# Patient Record
Sex: MALE | Race: WHITE | NOT HISPANIC OR LATINO | URBAN - METROPOLITAN AREA
[De-identification: names, ages, dates, MRNs, and addresses within clinical notes are randomized per-mention and may not be internally consistent; named-entity substitution may affect disease eponyms.]

---

## 2021-08-04 ENCOUNTER — INPATIENT (INPATIENT)
Facility: HOSPITAL | Age: 21
LOS: 15 days | Discharge: HOME | End: 2021-08-20
Attending: STUDENT IN AN ORGANIZED HEALTH CARE EDUCATION/TRAINING PROGRAM | Admitting: PSYCHIATRY & NEUROLOGY
Payer: COMMERCIAL

## 2021-08-04 VITALS
HEART RATE: 78 BPM | TEMPERATURE: 98 F | SYSTOLIC BLOOD PRESSURE: 113 MMHG | DIASTOLIC BLOOD PRESSURE: 74 MMHG | RESPIRATION RATE: 20 BRPM | OXYGEN SATURATION: 99 %

## 2021-08-04 NOTE — ED PROVIDER NOTE - OBJECTIVE STATEMENT
[place]: Bipolar. Overdose on melatonin. Ate CBD gummies today (3 bags). Says, "My mother has HBV God death sex but they don't understand what God feels like. [place] crazy experiences." Says he's very poplar and famous ("I never thought I was as important as I am. Many girls told me I can have their kids.") Tangential. Disorganized. Stayed at [facility] in [place]. From [facility]. R foot "feels it's being torn to shreds" x 1 wk, getting better. Says he walked from [place] to [place] (1 hour car ride). No SI/HI.

## 2021-08-04 NOTE — ED PROVIDER NOTE - PROGRESS NOTE DETAILS
Delano Roach D.O., PGY3 (Resident)  Patient still appears acutely manic with flight of ideas, tangential. Pending urine drug screen Delano Roach D.O., PGY3 (Resident)  Discussed with psych, will see patient.

## 2021-08-04 NOTE — ED PROVIDER NOTE - ATTENDING CONTRIBUTION TO CARE
I performed a face-to-face evaluation of the patient and performed a history and physical examination. I agree with the history and physical examination.    Bipolar. Manic phase. Check Psych labs. Psych consult for collaboration. Admit.

## 2021-08-04 NOTE — ED PROVIDER NOTE - PHYSICAL EXAMINATION
Well appearing, well nourished, awake, alert, oriented to person, place, time/situation and in no apparent distress.    Airway patent    Eyes without scleral injection. No jaundice.    Strong pulse.    Respirations unlabored.    Abdomen soft, non-tender, no guarding.    Spine appears normal, range of motion is not limited, no muscle or joint tenderness.    Alert and oriented, no gross motor or sensory deficits.    Skin normal color for race, warm, dry and intact. No evidence of rash.    No SI/HI. Well appearing, well nourished, awake, alert, oriented to person, place, time/situation and in no apparent distress.    Airway patent    Eyes without scleral injection. No jaundice.    Strong pulse.    Respirations unlabored.    Abdomen soft, non-tender, no guarding.    Spine appears normal, range of motion is not limited, no muscle or joint tenderness.    Alert and oriented, no gross motor or sensory deficits.    Skin normal color for race, warm, dry and intact. No evidence of rash.

## 2021-08-04 NOTE — ED ADULT TRIAGE NOTE - CHIEF COMPLAINT QUOTE
Pt overdosed on marijuana edibles. Pt with nausea and vomiting. As per EMS patient has been having hallucinations and delusions.
114

## 2021-08-05 DIAGNOSIS — F31.9 BIPOLAR DISORDER, UNSPECIFIED: ICD-10-CM

## 2021-08-05 LAB
ALBUMIN SERPL ELPH-MCNC: 4.4 G/DL — SIGNIFICANT CHANGE UP (ref 3.3–5)
ALP SERPL-CCNC: 38 U/L — LOW (ref 40–120)
ALT FLD-CCNC: 18 U/L — SIGNIFICANT CHANGE UP (ref 4–41)
ANION GAP SERPL CALC-SCNC: 14 MMOL/L — SIGNIFICANT CHANGE UP (ref 7–14)
APPEARANCE UR: SIGNIFICANT CHANGE UP
AST SERPL-CCNC: 31 U/L — SIGNIFICANT CHANGE UP (ref 4–40)
BASOPHILS # BLD AUTO: 0.03 K/UL — SIGNIFICANT CHANGE UP (ref 0–0.2)
BASOPHILS NFR BLD AUTO: 0.3 % — SIGNIFICANT CHANGE UP (ref 0–2)
BILIRUB SERPL-MCNC: 0.3 MG/DL — SIGNIFICANT CHANGE UP (ref 0.2–1.2)
BILIRUB UR-MCNC: NEGATIVE — SIGNIFICANT CHANGE UP
BUN SERPL-MCNC: 14 MG/DL — SIGNIFICANT CHANGE UP (ref 7–23)
CALCIUM SERPL-MCNC: 9.2 MG/DL — SIGNIFICANT CHANGE UP (ref 8.4–10.5)
CHLORIDE SERPL-SCNC: 105 MMOL/L — SIGNIFICANT CHANGE UP (ref 98–107)
CO2 SERPL-SCNC: 23 MMOL/L — SIGNIFICANT CHANGE UP (ref 22–31)
COLOR SPEC: YELLOW — SIGNIFICANT CHANGE UP
COVID-19 SPIKE DOMAIN AB INTERP: POSITIVE
COVID-19 SPIKE DOMAIN ANTIBODY RESULT: >250 U/ML — HIGH
CREAT SERPL-MCNC: 0.82 MG/DL — SIGNIFICANT CHANGE UP (ref 0.5–1.3)
DIFF PNL FLD: NEGATIVE — SIGNIFICANT CHANGE UP
EOSINOPHIL # BLD AUTO: 0.06 K/UL — SIGNIFICANT CHANGE UP (ref 0–0.5)
EOSINOPHIL NFR BLD AUTO: 0.6 % — SIGNIFICANT CHANGE UP (ref 0–6)
GLUCOSE SERPL-MCNC: 152 MG/DL — HIGH (ref 70–99)
GLUCOSE UR QL: NEGATIVE — SIGNIFICANT CHANGE UP
HCT VFR BLD CALC: 41 % — SIGNIFICANT CHANGE UP (ref 39–50)
HGB BLD-MCNC: 13.9 G/DL — SIGNIFICANT CHANGE UP (ref 13–17)
IANC: 7.18 K/UL — SIGNIFICANT CHANGE UP (ref 1.5–8.5)
IMM GRANULOCYTES NFR BLD AUTO: 0.2 % — SIGNIFICANT CHANGE UP (ref 0–1.5)
KETONES UR-MCNC: NEGATIVE — SIGNIFICANT CHANGE UP
LEUKOCYTE ESTERASE UR-ACNC: NEGATIVE — SIGNIFICANT CHANGE UP
LYMPHOCYTES # BLD AUTO: 1.47 K/UL — SIGNIFICANT CHANGE UP (ref 1–3.3)
LYMPHOCYTES # BLD AUTO: 15.7 % — SIGNIFICANT CHANGE UP (ref 13–44)
MCHC RBC-ENTMCNC: 30.2 PG — SIGNIFICANT CHANGE UP (ref 27–34)
MCHC RBC-ENTMCNC: 33.9 GM/DL — SIGNIFICANT CHANGE UP (ref 32–36)
MCV RBC AUTO: 89.1 FL — SIGNIFICANT CHANGE UP (ref 80–100)
MONOCYTES # BLD AUTO: 0.63 K/UL — SIGNIFICANT CHANGE UP (ref 0–0.9)
MONOCYTES NFR BLD AUTO: 6.7 % — SIGNIFICANT CHANGE UP (ref 2–14)
NEUTROPHILS # BLD AUTO: 7.18 K/UL — SIGNIFICANT CHANGE UP (ref 1.8–7.4)
NEUTROPHILS NFR BLD AUTO: 76.5 % — SIGNIFICANT CHANGE UP (ref 43–77)
NITRITE UR-MCNC: NEGATIVE — SIGNIFICANT CHANGE UP
NRBC # BLD: 0 /100 WBCS — SIGNIFICANT CHANGE UP
NRBC # FLD: 0 K/UL — SIGNIFICANT CHANGE UP
PCP SPEC-MCNC: SIGNIFICANT CHANGE UP
PH UR: 6 — SIGNIFICANT CHANGE UP (ref 5–8)
PLATELET # BLD AUTO: 189 K/UL — SIGNIFICANT CHANGE UP (ref 150–400)
POTASSIUM SERPL-MCNC: 4 MMOL/L — SIGNIFICANT CHANGE UP (ref 3.5–5.3)
POTASSIUM SERPL-SCNC: 4 MMOL/L — SIGNIFICANT CHANGE UP (ref 3.5–5.3)
PROT SERPL-MCNC: 6.4 G/DL — SIGNIFICANT CHANGE UP (ref 6–8.3)
PROT UR-MCNC: ABNORMAL
RBC # BLD: 4.6 M/UL — SIGNIFICANT CHANGE UP (ref 4.2–5.8)
RBC # FLD: 11.7 % — SIGNIFICANT CHANGE UP (ref 10.3–14.5)
SARS-COV-2 IGG+IGM SERPL QL IA: >250 U/ML — HIGH
SARS-COV-2 IGG+IGM SERPL QL IA: POSITIVE
SARS-COV-2 RNA SPEC QL NAA+PROBE: SIGNIFICANT CHANGE UP
SODIUM SERPL-SCNC: 142 MMOL/L — SIGNIFICANT CHANGE UP (ref 135–145)
SP GR SPEC: 1.04 — HIGH (ref 1.01–1.02)
TOXICOLOGY SCREEN, DRUGS OF ABUSE, SERUM RESULT: SIGNIFICANT CHANGE UP
TSH SERPL-MCNC: 0.57 UIU/ML — SIGNIFICANT CHANGE UP (ref 0.27–4.2)
UROBILINOGEN FLD QL: ABNORMAL
WBC # BLD: 9.39 K/UL — SIGNIFICANT CHANGE UP (ref 3.8–10.5)
WBC # FLD AUTO: 9.39 K/UL — SIGNIFICANT CHANGE UP (ref 3.8–10.5)

## 2021-08-05 PROCEDURE — 99285 EMERGENCY DEPT VISIT HI MDM: CPT

## 2021-08-05 PROCEDURE — 99221 1ST HOSP IP/OBS SF/LOW 40: CPT

## 2021-08-05 RX ORDER — LITHIUM CARBONATE 300 MG/1
600 TABLET, EXTENDED RELEASE ORAL AT BEDTIME
Refills: 0 | Status: DISCONTINUED | OUTPATIENT
Start: 2021-08-05 | End: 2021-08-07

## 2021-08-05 RX ORDER — DIPHENHYDRAMINE HCL 50 MG
50 CAPSULE ORAL AT BEDTIME
Refills: 0 | Status: DISCONTINUED | OUTPATIENT
Start: 2021-08-05 | End: 2021-08-20

## 2021-08-05 RX ORDER — HALOPERIDOL DECANOATE 100 MG/ML
5 INJECTION INTRAMUSCULAR EVERY 6 HOURS
Refills: 0 | Status: DISCONTINUED | OUTPATIENT
Start: 2021-08-05 | End: 2021-08-20

## 2021-08-05 RX ORDER — ARIPIPRAZOLE 15 MG/1
5 TABLET ORAL DAILY
Refills: 0 | Status: DISCONTINUED | OUTPATIENT
Start: 2021-08-05 | End: 2021-08-06

## 2021-08-05 NOTE — ED BEHAVIORAL HEALTH ASSESSMENT NOTE - SUMMARY
20 y/o single,  male , recently came to [place] from [place] 1 week ago , was residing with his parents until he moved to [place] , with no known medical hx , no hx found in psyckes. Pt self reports hx of Bipolar d/o , multiple hospitalizations , 2 hospitalizations 2 weeks ago in [place] "because my parents were calling  on me , and I was misrepresented", denies hx of sa, denies hx of violence , aggression. He is not complaint with medications "I was told I am not supposed to take anything". Pt admits to smoking marijuana "sometimes, denies any other substance use. PT bib ems from the hotel in [place] , after ingesting 3 bags of CBD gummies and took unknown amount of melatonin.    Patient presents acutely manic , tangential, illogical , sexually and religiously preoccupied . Pt at this time severely effectively dysregulated a, with unpredictability, poor insight requiring involuntary  psychiatric admission for safety and stabilization.
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
## 2021-08-05 NOTE — BH INPATIENT PSYCHIATRY ASSESSMENT NOTE - NSBHCHARTREVIEWVS_PSY_A_CORE FT
Vital Signs Last 24 Hrs  T(C): 36.9 (05 Aug 2021 12:50), Max: 37.2 (05 Aug 2021 00:30)  T(F): 98.4 (05 Aug 2021 12:50), Max: 99 (05 Aug 2021 00:30)  HR: 58 (05 Aug 2021 05:49) (58 - 78)  BP: 96/47 (05 Aug 2021 05:49) (96/47 - 124/75)  BP(mean): --  RR: 15 (05 Aug 2021 05:49) (15 - 20)  SpO2: 99% (05 Aug 2021 05:49) (97% - 99%)

## 2021-08-05 NOTE — BH INPATIENT PSYCHIATRY ASSESSMENT NOTE - RISK ASSESSMENT
Patient with acute rhonda with psychotic features, currently not in treatment, undomiciled; at acute risk for harm to self.

## 2021-08-05 NOTE — BH INPATIENT PSYCHIATRY ASSESSMENT NOTE - PAST PSYCHOTROPIC MEDICATION
Reports trials of Seroquel (unable to tolerate due to sedation), Latuda (for 1 week), and olanzapine.  No prior lithium or Depakote trials.

## 2021-08-05 NOTE — ED BEHAVIORAL HEALTH ASSESSMENT NOTE - RISK ASSESSMENT
Moderate Acute Suicide Risk pt is at elevated risk given acute mood sx, rhonda , with global insomnia , psychotic , not complaint with treatment, poor insight, active substance use , recent hospitalization . Risk factors will be mitigated by inpatient hospitalization. Protective factors, no known hx of sa , not violent or aggressive.
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
## 2021-08-05 NOTE — ED ADULT NURSE NOTE - CHIEF COMPLAINT QUOTE
Pt overdosed on marijuana edibles. Pt with nausea and vomiting. As per EMS patient has been having hallucinations and delusions.

## 2021-08-05 NOTE — ED BEHAVIORAL HEALTH ASSESSMENT NOTE - DETAILS
none known [facility] inpatient provider is not available uncle : bipolar d/o , Mom : depression and sa BOARDING self referred [facility]1- [place]

## 2021-08-05 NOTE — ED BEHAVIORAL HEALTH ASSESSMENT NOTE - DESCRIPTION
pt has been calm , cooperative , no prns required single, currently undomicile pt has been calm , cooperative , no prns required  Vital Signs Last 24 Hrs  T(C): 36.5 (05 Aug 2021 05:49), Max: 37.2 (05 Aug 2021 00:30)  T(F): 97.7 (05 Aug 2021 05:49), Max: 99 (05 Aug 2021 00:30)  HR: 58 (05 Aug 2021 05:49) (58 - 78)  BP: 96/47 (05 Aug 2021 05:49) (96/47 - 124/75)  BP(mean): --  RR: 15 (05 Aug 2021 05:49) (15 - 20)  SpO2: 99% (05 Aug 2021 05:49) (97% - 99%) none

## 2021-08-05 NOTE — ED BEHAVIORAL HEALTH ASSESSMENT NOTE - PSYCHIATRIC ISSUES AND PLAN (INCLUDE STANDING AND PRN MEDICATION)
start Abilify 5 mg qdaily. for agitation: [id number] 2mg po /im q6hrs prn . benadryl 50mg qhs for insomnia

## 2021-08-05 NOTE — BH INPATIENT PSYCHIATRY ASSESSMENT NOTE - OTHER PAST PSYCHIATRIC HISTORY (INCLUDE DETAILS REGARDING ONSET, COURSE OF ILLNESS, INPATIENT/OUTPATIENT TREATMENT)
as per hpi as per hpi; patient reports 3 years of on and off depression between 2017 and 2020.  Has >10 psychiatric hospitalizations.

## 2021-08-05 NOTE — BH INPATIENT PSYCHIATRY ASSESSMENT NOTE - NSBHMETABOLIC_PSY_ALL_CORE_FT
BMI: BMI (kg/m2): 19.9 (08-05-21 @ 12:50)  HbA1c:   Glucose:   BP: 96/47 (08-05-21 @ 05:49) (96/47 - 124/75)  Lipid Panel:

## 2021-08-05 NOTE — BH INPATIENT PSYCHIATRY ASSESSMENT NOTE - NSBHASSESSSUMMFT_PSY_ALL_CORE
ASSESSMENT:    The patient is a 21 year old male, recently living outside [place] with his family but in [place] for the last week, with a PPH of BD, multiple hospitalizations (most recently several weeks ago in [place]), multiple medication trials (most recently Seroquel) but currently not in treatment, no history of SA who was BIB EMS after vomiting in public after ingesting 3 bags of CBD gummies.     Working diagnosis  Lilli with psychotic features in the setting of untreated BP1    Currently, patient presents with pressured speech, flight of ideas, illogicality, and grandiosity. Patient distractible, impulsive (leaving psychiatric rouse, traveling around [place] and then to [facility] unaccompanied), grandiose ("I save people by having their children, etc," with increased activity (e.g. travel) and talkativeness. Patient meets criteria for lilli with psychotic features (delusions, illogicality) in the setting of medication non-adherence. Unclear if patient currently with AH, but endorses TH of worms in his leg. Denies SI.     Continued inpatient admission required for safety, stabilization, medication optimization, safe discharge planning.        PLAN:    1. Legal: Admitted on 9.27 status  2. Safety: No reported SI/SIB/HI/VI currently on unit; continue routine observation.   - PRNs haldol 5mg for agitation/ ativan 2mg for anxiety/ benadryl 50mg for insomnia   3. Psychiatric:   - Start lithium 600mg for lilli/mood stabilization; R/B/A and side effects discussed  - Consider augmentation with atypical antipsychotic  4. Therapy: individual therapy, group & milieu therapy  5. Medical: No acute medical needs identified  6. Collateral: Collateral from family   7. Disposition: When stable (include possible disposition plans when known)

## 2021-08-05 NOTE — BH INPATIENT PSYCHIATRY ASSESSMENT NOTE - MSE UNSTRUCTURED FT
MSE:  DA/BEH: Adult male in no acute distress; dressed in hospital gown; high-energy, engaging, cooperative; fair eye contact.   SPEECH: Pressured but interruptible, excessively productive and spontaneous  MOTOR: No PMR/PMA; no other abnormal movements.   MOOD: “alright."   AFFECT: elevated/irritable at times; expansive, mood incongruent   THOUGHT PROCESS: Flight of ideas, illogical, tangential   THOUGHT CONTENT: Denies SI/HI; grandiose/bizarre delusions elicited.   PERCEPTIONS: Denies AVH. Does not appear internally preoccupied. Endorses tactile hallucinations of worms in leg.  COGNITION: Grossly intact.   INSIGHT: Poor.   JUDGMENT: Poor.  IMPULSE CONTROL: Poor   MSE:  DA/BEH: Adult male in no acute distress; dressed in hospital gown; high-energy, engaging, cooperative; fair eye contact.   SPEECH: Pressured but interruptible, excessively productive and spontaneous  MOTOR: No PMR/PMA; no other abnormal movements.   MOOD: “alright."   AFFECT: elevated, less often irritable at times; expansive, mood incongruent   THOUGHT PROCESS: Flight of ideas, illogical, tangential   THOUGHT CONTENT: Denies SI/HI; grandiose/bizarre delusions elicited.   PERCEPTIONS: Denies AVH. Does not appear internally preoccupied. Endorses tactile hallucinations of worms in leg.  COGNITION: Grossly intact.   INSIGHT: Poor.   JUDGMENT: Poor.  IMPULSE CONTROL: Poor

## 2021-08-05 NOTE — ED BEHAVIORAL HEALTH NOTE - BEHAVIORAL HEALTH NOTE
COVID Exposure Screen- Patient    1. *Have you had a COVID-19 test in the last 90 days? (x ) Yes ( ) No ( ) Unknown- Reason: _____    IF YES PROCEED TO QUESTION #2. IF NO OR UNKNOWN, PLEASE SKIP TO QUESTION #3.    2. Date of test(s) and result(s): __negative ______    3. *Have you tested positive for COVID-19 antibodies? ( ) Yes ( ) No ( x) Unknown- Reason: _____    IF YES PROCEED TO QUESTION #4. IF NO or UNKNOWN, PLEASE SKIP TO QUESTION #5.    4. Date of positive antibody test: ________    5. *Have you received 2 doses of the COVID-19 vaccine? (x ) Yes ( ) No ( ) Unknown- Reason: _____    IF YES PROCEED TO QUESTION #6. IF NO or UNKNOWN, PLEASE SKIP TO QUESTION #7.    6. Date of second dose: _May_______    7. *In the past 10 days, have you been around anyone with a positive COVID-19 test?* ( ) Yes (x ) No ( ) Unknown- Reason: ____    IF YES PROCEED TO QUESTION #8. IF NO or UNKNOWN, PLEASE SKIP TO QUESTION #13.    8. Were you within 6 feet of them for at least 15 minutes? ( ) Yes ( ) No ( ) Unknown- Reason: _____    9. Have you provided care for them? ( ) Yes ( ) No ( ) Unknown- Reason: ______    10. Have you had direct physical contact with them (touched, hugged, or kissed them)? ( ) Yes ( ) No ( ) Unknown- Reason: _____    11. Have you shared eating or drinking utensils with them? ( ) Yes ( ) No ( ) Unknown- Reason: ____    12. Have they sneezed, coughed, or somehow gotten respiratory droplets on you? ( ) Yes ( ) No ( ) Unknown- Reason: ______    13. *Have you been out of [place] within the past 10 days?* (x ) Yes ( ) No ( ) Unknown- Reason: _____    IF YES PLEASE ANSWER THE FOLLOWING QUESTIONS:    14. Which state/country have you been to? _[place] _____    15. Were you there over 24 hours? (x ) Yes ( ) No ( ) Unknown- Reason: ______    16. Date of return to [facility]: _7 days ago_____

## 2021-08-05 NOTE — BH INPATIENT PSYCHIATRY ASSESSMENT NOTE - CASE SUMMARY
The patient is a 21 year old male, recently living outside [place] with his family but in [place] for the last week, with a PPH of bipolar disorder, multiple hospitalizations (most recently several weeks ago in [place]), multiple medication trials (most recently Seroquel) but currently not in treatment, no history of SA who was BIB EMS after vomiting in public after ingesting excess CBD gummies.  Presentation with pressured speech, flight of ideas, illogicality, and grandiosity consistent with rhonda with psychosis. Unclear if patient currently with AH, but endorses TH of worms in his leg. Denies SI. Denies he will do anything to remove worms or anything for any reason that could harm himself.  Patient states he is unwilling to take medications.  Psychoeducation provided.  Lithium trial warranted.

## 2021-08-05 NOTE — ED ADULT NURSE NOTE - OBJECTIVE STATEMENT
A&Ox3. non ambulatory.  pt brought in via EMS due to pt found wandering the streets and hallucinating. pt states he did three bags of edibles. pt is having grandiose ideas, flight of speech and rambling sentences. PMH of bipolar. pt states he is non compliant with medications. denies sob, cp, ha, n/v/d, fevers, chills, dizziness. respirations are even and un labored. skin intact. bruise observed to left top of foot. no active bleeding observed to left foot. 20g placed in right ac. labs drawn and sent. safety  precautions maintained. placed on cardiac monitor, NSR. ekg obtained. will continue to monitor.

## 2021-08-05 NOTE — BH INPATIENT PSYCHIATRY ASSESSMENT NOTE - CURRENT MEDICATION
MEDICATIONS  (STANDING):  ARIPiprazole 5 milliGRAM(s) Oral daily    MEDICATIONS  (PRN):  diphenhydrAMINE 50 milliGRAM(s) Oral at bedtime PRN Insomnia  haloperidol     Tablet 5 milliGRAM(s) Oral every 6 hours PRN agitation  haloperidol    Injectable 5 milliGRAM(s) IntraMuscular every 6 hours PRN Agitation  LORazepam     Tablet 2 milliGRAM(s) Oral every 6 hours PRN Agitation  LORazepam   Injectable 2 milliGRAM(s) IntraMuscular every 6 hours PRN Agitation

## 2021-08-05 NOTE — BH INPATIENT PSYCHIATRY ASSESSMENT NOTE - MODIFICATIONS
Patient seen by me, chart reviewed, and case discussed with treatment team.  Reviewed and agree with above progress note, assessment and plan; corrections and modification made where appropriate.

## 2021-08-05 NOTE — BH INPATIENT PSYCHIATRY ASSESSMENT NOTE - NSBHCHARTREVIEWLAB_PSY_A_CORE FT
LABS:                         13.9   9.39  )-----------( 189      ( 05 Aug 2021 01:19 )             41.0     08-    142  |  105  |  14  ----------------------------<  152<H>  4.0   |  23  |  0.82    Ca    9.2      05 Aug 2021 01:19    TPro  6.4  /  Alb  4.4  /  TBili  0.3  /  DBili  x   /  AST  31  /  ALT  18  /  AlkPhos  38<L>  08-05      Urinalysis Basic - ( 05 Aug 2021 12:39 )    Color: Yellow / Appearance: Hazy / S.036 / pH: x  Gluc: x / Ketone: Negative  / Bili: Negative / Urobili: 3 mg/dL   Blood: x / Protein: 30 mg/dL / Nitrite: Negative   Leuk Esterase: Negative / RBC: x / WBC 0-2 /HPF   Sq Epi: x / Non Sq Epi: x / Bacteria: x

## 2021-08-06 PROCEDURE — 99231 SBSQ HOSP IP/OBS SF/LOW 25: CPT
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
## 2021-08-06 NOTE — BH INPATIENT PSYCHIATRY PROGRESS NOTE - NSBHASSESSSUMMFT_PSY_ALL_CORE
ASSESSMENT:    The patient is a 21 year old male, recently living outside [place] with his family but in [place] for the last week, with a PPH of BD, multiple hospitalizations (most recently several weeks ago in [place]), multiple medication trials (most recently Seroquel) but currently not in treatment, no history of SA who was BIB EMS after vomiting in public after ingesting 3 bags of CBD gummies.     Working diagnosis  Lilli with psychotic features in the setting of untreated BP1    Currently, patient presents with pressured speech, flight of ideas, and grandiosity. Somewhat more linear today but remains illogical. Affect less expansive. Overall, with continued manic symptoms, psychosis (grandiose/bizarre delusions, tangentiality, TH). Denies SI. Patient reports being willing to try lithium; insight slightly improved but remains poor.     Continued inpatient admission required for safety, stabilization, medication optimization, safe discharge planning.        PLAN:    1. Legal: Admitted on 9.27 status  2. Safety: No reported SI/SIB/HI/VI currently on unit; continue routine observation.   - PRNs haldol 5mg for agitation/ ativan 2mg for anxiety/ benadryl 50mg for insomnia   3. Psychiatric:   - Start lithium 600mg for lilli/mood stabilization; R/B/A and side effects discussed  - Consider augmentation with atypical antipsychotic  4. Therapy: individual therapy, group & milieu therapy  5. Medical: No acute medical needs identified  6. Collateral: Collateral from family   7. Disposition: When stable (include possible disposition plans when known)

## 2021-08-06 NOTE — BH INPATIENT PSYCHIATRY PROGRESS NOTE - MSE UNSTRUCTURED FT
MSE:  DA/BEH: Adult male in no acute distress; dressed in hospital gown; high-energy, engaging, cooperative; fair eye contact.   SPEECH: Pressured but interruptible, very productive and spontaneous  MOTOR: No PMR/PMA; no other abnormal movements.   MOOD: “feeling great"   AFFECT: elevated, bright, intermittently irritable; less expansive, mood congruent   THOUGHT PROCESS: Flight of ideas, tangential, less illogical  THOUGHT CONTENT: Denies SI/HI; grandiose/bizarre delusions elicited.   PERCEPTIONS: Denies AVH. Does not appear internally preoccupied. Endorses tactile hallucinations of worms in leg.  COGNITION: Grossly intact.   INSIGHT: Poor.   JUDGMENT: Poor.  IMPULSE CONTROL: Poor

## 2021-08-06 NOTE — PSYCHIATRIC REHAB INITIAL EVALUATION - NSBHPRTOOLBOX_PSY_ALL_CORE
Art/Entertainment/Exercise/Meditation/Mindfulness practice/Music/Outdoor activity/Reading/Spirituality/Mandaeism

## 2021-08-06 NOTE — PSYCHIATRIC REHAB INITIAL EVALUATION - LIVES WITH
Pt was previously living with parents in [place] until a week ago when pt moved to [place]. Pt has been living on the streets and in a hotel since./parent(s)

## 2021-08-06 NOTE — PSYCHIATRIC REHAB INITIAL EVALUATION - NSBHPRRECOMMEND_PSY_ALL_CORE
Writer met with patient to orient to unit and introduce self, psychiatric rehabilitation staff and department functions. Patient was provided a copy of the unit schedule. On interview, patient was cooperative, tangential, and grandiose. Patient presented with appropriate ADL’s and eye contact. Patient demonstrated poor insight and judgement into his symptoms and treatment at this time. Writer encouraged patient to attend psychiatric rehabilitation groups and engage in treatment. Writer and patient were able to establish a collaborative psychiatric rehabilitation goal. Psychiatric Rehabilitation staff will continue to engage patient daily in order to develop therapeutic rapport.

## 2021-08-07 PROCEDURE — 99231 SBSQ HOSP IP/OBS SF/LOW 25: CPT

## 2021-08-07 RX ORDER — LITHIUM CARBONATE 300 MG/1
450 TABLET, EXTENDED RELEASE ORAL AT BEDTIME
Refills: 0 | Status: DISCONTINUED | OUTPATIENT
Start: 2021-08-07 | End: 2021-08-09

## 2021-08-07 NOTE — BH INPATIENT PSYCHIATRY PROGRESS NOTE - MSE UNSTRUCTURED FT
On exam today the patient is manic but attempts to be superficially cooperative with fair eye contact.    Speech is clear and of normal rate.    Thought process: overinclusive and with flight of ideas.    Thought content: with multiple bizarre and grandiose psychotic beliefs.  Perception: Denies hallucinations.    Mood: Describes as "blessed".  Affect: constricted.    Patient denies active suicidal ideation, intent and plan.    Patient denies active aggressive/homicidal ideation, intent or plan.   Patient is Alert and oriented in all spheres. Patient is cognitively grossly intact.   Insight and judgment are limited. Impulse control is intact at this time.       No

## 2021-08-07 NOTE — BH INPATIENT PSYCHIATRY PROGRESS NOTE - NSBHFUPINTERVALHXFT_PSY_A_CORE
Patient seen for follow up of Bipolar Disorder  Case discussed with staff  Patient refused the lithium stating "600 mg seemed too much and my rhonda is under my control"  With poor insight into his symptoms and illness

## 2021-08-07 NOTE — BH INPATIENT PSYCHIATRY PROGRESS NOTE - NSBHASSESSSUMMFT_PSY_ALL_CORE
ASSESSMENT:    The patient is a 21 year old male, recently living outside [place] with his family but in [place] for the last week, with a PPH of BD, multiple hospitalizations (most recently several weeks ago in [place]), multiple medication trials (most recently Seroquel) but currently not in treatment, no history of SA who was BIB EMS after vomiting in public after ingesting 3 bags of CBD gummies.     8/7   remains manic with psychotic features  PLAN:  refusing lithium at 600 mg and offered Eskalith at 450 mg but unlikely that he will comply

## 2021-08-08 PROCEDURE — 99231 SBSQ HOSP IP/OBS SF/LOW 25: CPT

## 2021-08-08 RX ORDER — LANOLIN ALCOHOL/MO/W.PET/CERES
3 CREAM (GRAM) TOPICAL ONCE
Refills: 0 | Status: COMPLETED | OUTPATIENT
Start: 2021-08-08 | End: 2021-08-08

## 2021-08-08 RX ADMIN — Medication 3 MILLIGRAM(S): at 21:56

## 2021-08-08 NOTE — BH INPATIENT PSYCHIATRY PROGRESS NOTE - MSE UNSTRUCTURED FT
On exam today the patient is manic but attempts to be superficially cooperative with fair eye contact.    Speech is clear and of normal rate.    Thought process: overinclusive and with flight of ideas.    Thought content: with multiple bizarre and grandiose psychotic beliefs that he is the messiah and multiple women want to bear his children.  Perception: Denies hallucinations.    Mood: Describes as "in my control".  Affect: constricted.    Patient denies active suicidal ideation, intent and plan.    Patient denies active aggressive/homicidal ideation, intent or plan.   Patient is Alert and oriented in all spheres. Patient is cognitively grossly intact.   Insight and judgment are limited. Impulse control is intact at this time.

## 2021-08-08 NOTE — BH INPATIENT PSYCHIATRY PROGRESS NOTE - NSBHASSESSSUMMFT_PSY_ALL_CORE
ASSESSMENT:    The patient is a 21 year old male, recently living outside [place] with his family but in [place] for the last week, with a PPH of BD, multiple hospitalizations (most recently several weeks ago in [place]), multiple medication trials (most recently Seroquel) but currently not in treatment, no history of SA who was BIB EMS after vomiting in public after ingesting 3 bags of CBD gummies.     8/8   remains manic with multiple grandiose and bizarre delusions  PLAN:  Eskalith at 450 mg but unlikely that he will comply

## 2021-08-08 NOTE — BH INPATIENT PSYCHIATRY PROGRESS NOTE - NSBHFUPINTERVALHXFT_PSY_A_CORE
Patient seen for follow up of Bipolar Disorder  Case discussed with staff  Patient refused the lithium after I lowered it to 450 mg last night  With multiple delusions and becomes frustrated with writer when they are challenged   With poor insight into his symptoms and illness

## 2021-08-09 PROCEDURE — 99231 SBSQ HOSP IP/OBS SF/LOW 25: CPT

## 2021-08-09 RX ORDER — LITHIUM CARBONATE 300 MG/1
300 TABLET, EXTENDED RELEASE ORAL ONCE
Refills: 0 | Status: COMPLETED | OUTPATIENT
Start: 2021-08-09 | End: 2021-08-09

## 2021-08-09 RX ADMIN — LITHIUM CARBONATE 300 MILLIGRAM(S): 300 TABLET, EXTENDED RELEASE ORAL at 11:16

## 2021-08-09 NOTE — BH INPATIENT PSYCHIATRY PROGRESS NOTE - MSE UNSTRUCTURED FT
MSE:  DA/BEH: Adult male in no acute distress; dressed in street clothes; engaging, cooperative; fair eye contact.   SPEECH: Pressured but interruptible, somewhat decreased productivity and spontaneity  MOTOR: No PMR/PMA; no other abnormal movements.   MOOD: “great"   AFFECT: elevated, bright, intermittently irritable; less expansive, mood congruent   THOUGHT PROCESS: Flight of ideas, tangential, less illogical  THOUGHT CONTENT: Denies SI/HI; grandiose delusions elicited.   PERCEPTIONS: Denies AVH. Does not appear internally preoccupied. Denies TH.   COGNITION: Grossly intact.   INSIGHT: Poor.   JUDGMENT: Poor.  IMPULSE CONTROL: Poor   MSE:  DA/BEH: Adult male in no acute distress; dressed in street clothes; engaging, cooperative; fair eye contact.   SPEECH: Pressured but interruptible, somewhat decreased from very high rate of productivity and spontaneity  MOTOR: No PMR/PMA; no other abnormal movements.   MOOD: “great"   AFFECT: elevated, bright, intermittently irritable; less expansive, mood congruent   THOUGHT PROCESS: Flight of ideas, tangential, less illogical  THOUGHT CONTENT: Denies SI/HI; grandiose delusions elicited.   PERCEPTIONS: Denies AVH. Does not appear internally preoccupied. Denies TH.   COGNITION: Grossly intact.   INSIGHT: Poor.   JUDGMENT: Poor.  IMPULSE CONTROL: Poor

## 2021-08-09 NOTE — BH INPATIENT PSYCHIATRY PROGRESS NOTE - NSBHASSESSSUMMFT_PSY_ALL_CORE
ASSESSMENT:    The patient is a 21 year old male, recently living outside [place] with his family but in [place] for the last week, with a PPH of BD, multiple hospitalizations (most recently several weeks ago in [place]), multiple medication trials (most recently Seroquel) but currently not in treatment, no history of SA who was BIB EMS after vomiting in public after ingesting 3 bags of CBD gummies.     Working diagnosis  Lilli with psychotic features in the setting of untreated BP1    Currently, patient presents with pressured speech (although less so and more interruptible), flight of ideas, and grandiosity. Somewhat more linear today but remains tangential and illogical. Affect less expansive. Overall, with continued manic symptoms, psychosis (grandiose delusions, tangentiality, TH). Denies SI. Patient reports being willing to try lower dose of lithium; insight slightly improved but remains poor.     Continued inpatient admission required for safety, stabilization, medication optimization, safe discharge planning.        PLAN:    1. Legal: Admitted on 9.27 status  2. Safety: No reported SI/SIB/HI/VI currently on unit; continue routine observation.   - PRNs haldol 5mg for agitation/ ativan 2mg for anxiety/ benadryl 50mg for insomnia   3. Psychiatric:   - Start lithium 300mg for lilli/mood stabilization; R/B/A and side effects discussed  - Consider augmentation with atypical antipsychotic  4. Therapy: individual therapy, group & milieu therapy  5. Medical: No acute medical needs identified  6. Collateral: Collateral from family   7. Disposition: When stable (include possible disposition plans when known)

## 2021-08-09 NOTE — BH INPATIENT PSYCHIATRY PROGRESS NOTE - NSBHFUPINTERVALHXFT_PSY_A_CORE
Chart reviewed. Case discussed with interdisciplinary team. Patient seen and examined for follow up of rhonda. No acute overnight events. Refused medications. No PRNs required or requested. Per sleep log, fair sleep.     Patient seen this morning in conference room by writer. Patient reports he is doing well. Says current admission is a "test of strength." Did not provide coherent explanation. States that he has a lot of "external connections" and that many women in his life want to be with him/become impregnated by him. Refers to period between June 2019 and May 2020 when he was depressed. 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 States "the universe is not letting me take it," referring to Lithium.  Sleep and appetite in tact. No physical complaints.

## 2021-08-10 PROCEDURE — 99231 SBSQ HOSP IP/OBS SF/LOW 25: CPT

## 2021-08-10 RX ORDER — LITHIUM CARBONATE 300 MG/1
300 TABLET, EXTENDED RELEASE ORAL DAILY
Refills: 0 | Status: DISCONTINUED | OUTPATIENT
Start: 2021-08-10 | End: 2021-08-10

## 2021-08-10 RX ORDER — LITHIUM CARBONATE 300 MG/1
300 TABLET, EXTENDED RELEASE ORAL AT BEDTIME
Refills: 0 | Status: DISCONTINUED | OUTPATIENT
Start: 2021-08-10 | End: 2021-08-20

## 2021-08-10 NOTE — BH INPATIENT PSYCHIATRY PROGRESS NOTE - MSE UNSTRUCTURED FT
MSE:  DA/BEH: Adult male in no acute distress; dressed in street clothes; engaging, cooperative; fair eye contact.   SPEECH: Pressured but interruptible, somewhat decreased from very high rate of productivity and spontaneity  MOTOR: No PMR/PMA; no other abnormal movements.   MOOD: “great"   AFFECT: elevated, bright, intermittently irritable; less expansive, mood congruent   THOUGHT PROCESS: Flight of ideas, tangential, less illogical  THOUGHT CONTENT: Denies SI/HI; grandiose delusions elicited.   PERCEPTIONS: Denies AVH. Does not appear internally preoccupied. Denies TH.   COGNITION: Grossly intact.   INSIGHT: Poor.   JUDGMENT: Poor.  IMPULSE CONTROL: Poor   MSE:  DA/BEH: Adult male in no acute distress; dressed in street clothes; engaging, cooperative, calmer; fair eye contact.   SPEECH: Less pressured, somewhat decreased from very high rate of productivity and spontaneity  MOTOR: No PMR/PMA; no other abnormal movements.   MOOD: “fine"   AFFECT: euthymic to elevated, intermittently irritable; less expansive, mood congruent   THOUGHT PROCESS: Flight of ideas, more linear but moments of tangentiality and illogicality   THOUGHT CONTENT: Denies SI/HI; no overt delusions elicited.   PERCEPTIONS: Denies AVH. Does not appear internally preoccupied. Denies TH.   COGNITION: Grossly intact.   INSIGHT: Poor.   JUDGMENT: Poor.  IMPULSE CONTROL: Poor

## 2021-08-10 NOTE — BH INPATIENT PSYCHIATRY PROGRESS NOTE - NSBHASSESSSUMMFT_PSY_ALL_CORE
ASSESSMENT:    The patient is a 21 year old male, recently living outside [place] with his family but in [place] for the last week, with a PPH of BD, multiple hospitalizations (most recently several weeks ago in [place]), multiple medication trials (most recently Seroquel) but currently not in treatment, no history of SA who was BIB EMS after vomiting in public after ingesting 3 bags of CBD gummies.     Working diagnosis  Lilli with psychotic features in the setting of untreated BP1    Currently, patient presents with pressured speech (although less so and more interruptible), flight of ideas, and grandiosity. Somewhat more linear today but remains tangential and illogical. Affect less expansive. Overall, with continued manic symptoms, psychosis (grandiose delusions, tangentiality, TH). Denies SI. Patient reports being willing to try lower dose of lithium; insight slightly improved but remains poor.     Continued inpatient admission required for safety, stabilization, medication optimization, safe discharge planning.        PLAN:    1. Legal: Admitted on 9.27 status  2. Safety: No reported SI/SIB/HI/VI currently on unit; continue routine observation.   - PRNs haldol 5mg for agitation/ ativan 2mg for anxiety/ benadryl 50mg for insomnia   3. Psychiatric:   - Start lithium 300mg for lilli/mood stabilization; R/B/A and side effects discussed  - Consider augmentation with atypical antipsychotic  4. Therapy: individual therapy, group & milieu therapy  5. Medical: No acute medical needs identified  6. Collateral: Collateral from family   7. Disposition: When stable (include possible disposition plans when known)   ASSESSMENT:    The patient is a 21 year old male, recently living outside [place] with his family but in [place] for the last week, with a PPH of BD, multiple hospitalizations (most recently several weeks ago in [place]), multiple medication trials (most recently Seroquel) but currently not in treatment, no history of SA who was BIB EMS after vomiting in public after ingesting 3 bags of CBD gummies.     Working diagnosis  Lilli with psychotic features in the setting of untreated BP1    Currently, patient presents with less pressured/more interruptible speech and flight of ideas. Grandiosity diminished. More linear today but remains tangential and illogical. Affect less expansive. Overall, with lessened manic symptoms. No overt delusions elicited. Denies SI. Willing to continue with Lithium but impulse control/judgment remains poor.     Continued inpatient admission required for safety, stabilization, medication optimization, safe discharge planning.        PLAN:    1. Legal: Admitted on 9.27 status  2. Safety: No reported SI/SIB/HI/VI currently on unit; continue routine observation.   - PRNs haldol 5mg for agitation/ ativan 2mg for anxiety/ benadryl 50mg for insomnia   3. Psychiatric:   - C/w lithium 300mg qhs for lilli/mood stabilization; R/B/A and side effects discussed  - Consider augmentation with atypical antipsychotic  4. Therapy: individual therapy, group & milieu therapy  5. Medical: No acute medical needs identified  6. Collateral: Collateral from family   7. Disposition: When stable (include possible disposition plans when known)

## 2021-08-11 PROCEDURE — 99231 SBSQ HOSP IP/OBS SF/LOW 25: CPT

## 2021-08-11 NOTE — BH INPATIENT PSYCHIATRY PROGRESS NOTE - MSE UNSTRUCTURED FT
MSE:  DA/BEH: Adult male in no acute distress; dressed in street clothes; engaging, cooperative, calmer; fair eye contact.   SPEECH: Approaching normal rate; increased productivity and spontaneity   MOTOR: No PMR/PMA; no other abnormal movements.   MOOD: "alright"   AFFECT: irritable; stable, reactive, mood incongruent   THOUGHT PROCESS: More linear but moments of illogicality and circumstantiality   THOUGHT CONTENT: Denies SI/HI; no overt delusions elicited.   PERCEPTIONS: Denies AVH. Does not appear internally preoccupied. Denies TH.   COGNITION: Grossly intact.   INSIGHT: Poor.   JUDGMENT: Poor.  IMPULSE CONTROL: Poor

## 2021-08-11 NOTE — BH INPATIENT PSYCHIATRY PROGRESS NOTE - NSBHFUPINTERVALHXFT_PSY_A_CORE
Chart reviewed. Case discussed with interdisciplinary team. Patient seen and examined for follow up of rhonda. No acute overnight events. Refused medications. No PRNs required or requested. Per sleep log, fair sleep.     Patient seen this morning in day room by writer. Patient says he is feeling alright. Patient says he refused Lithium because he doesn't want to become dependent on it. Thinks he didn't need to be admitted to a psych hospital. Thinks he only needed admission to medical hospital for recent nausea/vomiting. Patient says "the premise of my stay is experimental." Psychoeducation provided on need for treatment and that treatment is backed by evidence.  Sleep and appetite in tact. No other physical complaints.

## 2021-08-11 NOTE — BH INPATIENT PSYCHIATRY PROGRESS NOTE - NSBHASSESSSUMMFT_PSY_ALL_CORE
ASSESSMENT:    The patient is a 21 year old male, recently living outside [place] with his family but in [place] for the last week, with a PPH of BD, multiple hospitalizations (most recently several weeks ago in [place]), multiple medication trials (most recently Seroquel) but currently not in treatment, no history of SA who was BIB EMS after vomiting in public after ingesting 3 bags of CBD gummies.     Working diagnosis  Lilli with psychotic features in the setting of untreated BP1    Currently, patient's speech approaching normal rate; TP more linear but illogical and circumstantial at moments. Grandiosity diminished. Affect irritable. Overall, with lessened manic symptoms. No overt delusions elicited. Denies SI. Refusing medications. Discharge focused. Insight poor.     Continued inpatient admission required for safety, stabilization, medication optimization, safe discharge planning.        PLAN:    1. Legal: Admitted on 9.27 status  2. Safety: No reported SI/SIB/HI/VI currently on unit; continue routine observation.   - PRNs haldol 5mg for agitation/ ativan 2mg for anxiety/ benadryl 50mg for insomnia   3. Psychiatric:   - C/w lithium 300mg qhs for lilli/mood stabilization; R/B/A and side effects discussed  - Consider augmentation with atypical antipsychotic  4. Therapy: individual therapy, group & milieu therapy  5. Medical: No acute medical needs identified  6. Collateral: Collateral from family   7. Disposition: When stable (include possible disposition plans when known)

## 2021-08-12 PROCEDURE — 99231 SBSQ HOSP IP/OBS SF/LOW 25: CPT
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
## 2021-08-12 NOTE — BH PSYCHOLOGY - GROUP THERAPY NOTE - NSPSYCHOLGRPDBTPT_PSY_A_CORE FT
DBT Group is a group in which patients learn skills to better manage their emotions and behaviors. Group begins with a mindfulness practice so that patients have an opportunity to practice observing their internal and external experiences.  Following the mindfulness exercise the group learns new skills in a didactic format. Group today focused on the “emotion regulation” module.  Specifically, patients learned about the Model for Describing Emotions, which explains the different components of an emotion, including prompting event, vulnerability factors, interpretations, biological changes and actions. Patients were also taught various techniques for changing the trajectory of an emotion through mindful awareness, problem solving, check the facts and opposite action.  
Anxiety
DBT Group is a group in which patients learn skills to better manage their emotions and behaviors. Group begins with a mindfulness practice so that patients have an opportunity to practice observing their internal and external experiences. Following the mindfulness exercise the group learns new skills in a didactic format. Group today focused on the “distress tolerance” module, which are skills to help patients manage their crisis urges. Specifically, pts learned the “TIP" skills to change body chemistry in order to quickly reduce intense emotions. Patients engaged in practice of the skills in group and were asked to identify several examples of how they would practice the skills outside of group. HW assigned to practice at least one TIP skill.
DBT Group is a group in which patients learn skills to better manage their emotions and behaviors. Group begins with a mindfulness practice so that patients have an opportunity to practice observing their internal and external experiences.  Following the mindfulness exercise the group learns new skills in a didactic format. Pts were taught the concept of “wise mind,” which is about identifying different states of mind (emotional vs. reasonable mind) and finding ways to tap into wise mind which is a blend of the two, and the state of mind that is consistent with wise decision making and long term goals and values.
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
## 2021-08-12 NOTE — BH PSYCHOLOGY - GROUP THERAPY NOTE - NSBHPSYCHOLRESPONSE_PSY_A_CORE
Coping skills acquired/Insight displayed/Accepted support
Coping skills acquired/Accepted support
Coping skills acquired/Insight displayed/Accepted support

## 2021-08-12 NOTE — BH PSYCHOLOGY - GROUP THERAPY NOTE - NSPSYCHOLGRPDBTPROB_PSY_A_CORE
emotional dysregulation/poor judgment

## 2021-08-12 NOTE — BH PSYCHOLOGY - GROUP THERAPY NOTE - NSPSYCHOLGRPDBTGOAL_PSY_A_CORE
reduce mood and affective lability/improve ability to indentify feelings/improve ability to communicate feelings/reduce vulnerability to emotional dysregualation
reduce mood and affective lability/improve ability to indentify feelings/improve ability to communicate feelings/reduce vulnerability to emotional dysregualation
reduce mood and affective lability/improve ability to indentify feelings/reduce vulnerability to emotional dysregualation

## 2021-08-12 NOTE — BH PSYCHOLOGY - GROUP THERAPY NOTE - NSPSYCHOLGRPDBTPT_PSY_A_CORE
stable mood and affect in group/patient showing good behavior control/no self-destructive impulses/behaviors/Patient able to identify mood states/other...

## 2021-08-13 PROCEDURE — 99231 SBSQ HOSP IP/OBS SF/LOW 25: CPT
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
## 2021-08-13 NOTE — BH INPATIENT PSYCHIATRY PROGRESS NOTE - MSE UNSTRUCTURED FT
MSE:  DA/BEH: Adult male in no acute distress; dressed in street clothes; uncooperative, frustrated; fair eye contact.   SPEECH: Approaching normal rate; increased productivity and spontaneity   MOTOR: No PMR/PMA; no other abnormal movements.   MOOD: "okay"   AFFECT: irritable; stable, reactive, mood incongruent   THOUGHT PROCESS: Tangential with intermittent loosening of associations   THOUGHT CONTENT: Denies SI/HI; delusions of grandeur, delusions of knowing the future  PERCEPTIONS: Denies AVH. Does not appear internally preoccupied. Denies TH.   COGNITION: Grossly intact.   INSIGHT: Poor.   JUDGMENT: Poor.  IMPULSE CONTROL: Poor

## 2021-08-13 NOTE — BH INPATIENT PSYCHIATRY PROGRESS NOTE - NSBHASSESSSUMMFT_PSY_ALL_CORE
ASSESSMENT:    The patient is a 21 year old male, recently living outside [place] with his family but in [place] for the last week, with a PPH of BD, multiple hospitalizations (most recently several weeks ago in [place]), multiple medication trials (most recently Seroquel) but currently not in treatment, no history of SA who was BIB EMS after vomiting in public after ingesting 3 bags of CBD gummies.     Working diagnosis  Lilli with psychotic features in the setting of untreated BP1    Currently, patient presents with irritable affect; TP tangential with intermittent loosening of associations. Delusions of grandeur, delusions of psychic jacobson remain. Overall, continued psychotic symptoms in the setting of untreated lilli. Denies SI. Refusing medications. Discharge focused. Insight very poor.     Continued inpatient admission required for safety, stabilization, medication optimization, safe discharge planning.        PLAN:    1. Legal: Admitted on 9.27 status  2. Safety: No reported SI/SIB/HI/VI currently on unit; continue routine observation.   - PRNs haldol 5mg for agitation/ ativan 2mg for anxiety/ benadryl 50mg for insomnia   3. Psychiatric:   - C/w lithium 300mg qhs for lilli/mood stabilization; R/B/A and side effects discussed  - Consider augmentation with atypical antipsychotic  4. Therapy: individual therapy, group & milieu therapy  5. Medical: No acute medical needs identified  6. Collateral: Collateral from family   7. Disposition: When stable (include possible disposition plans when known)   ASSESSMENT:    The patient is a 21 year old male, recently living outside [place] with his family but in [place] for the last week, with a PPH of BD, multiple hospitalizations (most recently several weeks ago in [place]), multiple medication trials (most recently Seroquel) but currently not in treatment, no history of SA who was BIB EMS after vomiting in public after ingesting 3 bags of CBD gummies.     Working diagnosis  Lilli with psychotic features in the setting of untreated BP1    Currently, patient presents with irritable affect; TP tangential with intermittent loosening of associations. Delusions of grandeur, delusions of psychic jacobson remain. Overall, continued psychotic symptoms in the setting of untreated lilli. Denies SI. Refusing medications. Discharge focused. Insight very poor.     Continued inpatient admission required for safety, stabilization, medication optimization, safe discharge planning.    PLAN:    1. Legal: Admitted on 9.27 status  2. Safety: No reported SI/SIB/HI/VI currently on unit; continue routine observation.   - PRNs haldol 5mg for agitation/ ativan 2mg for anxiety/ benadryl 50mg for insomnia   3. Psychiatric:   - C/w lithium 300mg qhs for lilli/mood stabilization; R/B/A and side effects discussed  - Consider augmentation with atypical antipsychotic  4. Therapy: individual therapy, group & milieu therapy  5. Medical: No acute medical needs identified  6. Collateral: Collateral from family   7. Disposition: When stable

## 2021-08-16 PROCEDURE — 99231 SBSQ HOSP IP/OBS SF/LOW 25: CPT

## 2021-08-16 NOTE — BH INPATIENT PSYCHIATRY PROGRESS NOTE - NSBHFUPINTERVALHXFT_PSY_A_CORE
Chart reviewed. Case discussed with interdisciplinary team. Patient seen and examined for follow up of rhonda. No acute overnight events. Refused medications. No PRNs required or requested. Per sleep log, fair sleep.     Patient seen outside. Discusses experience of rhonda.  Expresses desire to visit his grandmother in [place].  Continues to discuss odd beliefs about clarvoyance, seeing future events.

## 2021-08-16 NOTE — BH INPATIENT PSYCHIATRY PROGRESS NOTE - MSE UNSTRUCTURED FT
MSE:  DA/BEH: Adult male in no acute distress; dressed in street clothes; cooperative; fair eye contact.   SPEECH: Approaching normal rate; increased productivity and spontaneity   MOTOR: No PMR/PMA; no other abnormal movements.   MOOD: "good"   AFFECT: neutral to euthymic; stable, reactive, mood incongruent   THOUGHT PROCESS: Tangential with intermittent loosening of associations   THOUGHT CONTENT: Denies SI/HI; delusions of grandeur, delusions of knowing the future  PERCEPTIONS: Denies AVH. Does not appear internally preoccupied. Denies TH.   COGNITION: Grossly intact.   INSIGHT: Poor.   JUDGMENT: Poor.  IMPULSE CONTROL: Poor

## 2021-08-16 NOTE — BH INPATIENT PSYCHIATRY PROGRESS NOTE - NSBHASSESSSUMMFT_PSY_ALL_CORE
ASSESSMENT:    The patient is a 21 year old male, recently living outside [place] with his family but in [place] for the last week, with a PPH of BD, multiple hospitalizations (most recently several weeks ago in [place]), multiple medication trials (most recently Seroquel) but currently not in treatment, no history of SA who was BIB EMS after vomiting in public after ingesting 3 bags of CBD gummies.     Working diagnosis  Lilli with psychotic features in the setting of untreated BP1    Currently, patient presents with irritable affect; TP tangential with intermittent loosening of associations. Delusions of grandeur, delusions of psychic jacobson remain. Overall, continued psychotic symptoms in the setting of untreated lilli. Denies SI. Refusing medications. Discharge focused. Insight very poor.     Continued inpatient admission required for safety, stabilization, medication optimization, safe discharge planning.    PLAN:    1. Legal: Admitted on 9.27 status  2. Safety: No reported SI/SIB/HI/VI currently on unit; continue routine observation.   - PRNs haldol 5mg for agitation/ ativan 2mg for anxiety/ benadryl 50mg for insomnia   3. Psychiatric:   - C/w lithium 300mg qhs for lilli/mood stabilization; R/B/A and side effects discussed  - Consider augmentation with atypical antipsychotic  4. Therapy: individual therapy, group & milieu therapy  5. Medical: No acute medical needs identified  6. Collateral: Collateral from family   7. Disposition: When stable

## 2021-08-17 PROCEDURE — 99231 SBSQ HOSP IP/OBS SF/LOW 25: CPT

## 2021-08-17 RX ADMIN — Medication 50 MILLIGRAM(S): at 21:37

## 2021-08-17 NOTE — BH INPATIENT PSYCHIATRY PROGRESS NOTE - MSE UNSTRUCTURED FT
MSE:  DA/BEH: Adult male in no acute distress; dressed in street clothes; cooperative; fair eye contact.   SPEECH: Approaching normal rate; increased productivity and spontaneity   MOTOR: No PMR/PMA; no other abnormal movements.   MOOD: "good"   AFFECT: neutral to euthymic; stable, reactive, mood incongruent   THOUGHT PROCESS: Grossly linear initially before becoming increasingly tangential with loosening of associations   THOUGHT CONTENT: Denies SI/HI; delusions of grandeur, delusions of knowing the future  PERCEPTIONS: Denies AVH. Does not appear internally preoccupied. Denies TH.   COGNITION: Grossly intact.   INSIGHT: Poor.   JUDGMENT: Poor.  IMPULSE CONTROL: Poor   MSE:  DA/BEH: Adult male in no acute distress; dressed in street clothes; cooperative; fair eye contact.   SPEECH: Approaching normal rate; increased productivity and spontaneity   MOTOR: No PMR/PMA; no other abnormal movements.   MOOD: "good"   AFFECT: neutral to euthymic; stable, reactive, mood incongruent   THOUGHT PROCESS: Grossly linear initially before becoming increasingly tangential with loosening of associations   THOUGHT CONTENT: Denies SI/HI; delusions of grandeur, delusions of knowing the future  PERCEPTIONS: Denies AVH. Does not appear internally preoccupied. Denies TH.   COGNITION: Grossly intact.   INSIGHT: Poor.   JUDGMENT: Poor.  IMPULSE CONTROL: fair
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
## 2021-08-18 PROCEDURE — 99231 SBSQ HOSP IP/OBS SF/LOW 25: CPT

## 2021-08-18 RX ORDER — LANOLIN ALCOHOL/MO/W.PET/CERES
3 CREAM (GRAM) TOPICAL AT BEDTIME
Refills: 0 | Status: DISCONTINUED | OUTPATIENT
Start: 2021-08-18 | End: 2021-08-20

## 2021-08-18 RX ORDER — LANOLIN ALCOHOL/MO/W.PET/CERES
3 CREAM (GRAM) TOPICAL ONCE
Refills: 0 | Status: DISCONTINUED | OUTPATIENT
Start: 2021-08-18 | End: 2021-08-20

## 2021-08-18 RX ADMIN — Medication 3 MILLIGRAM(S): at 22:29

## 2021-08-18 NOTE — BH DISCHARGE NOTE NURSING/SOCIAL WORK/PSYCH REHAB - NSCDUDCCRISIS_PSY_A_CORE
[facility]  [phone]) [facility]LL ([phone])  Text "WELL" to [phone]  Website: [email or web address]/.[employer] [facility] 1 [phone] 371-QHCQ (3950) Website: www.safehorizon.org/.National Suicide Prevention Lifeline 4 (532) 542-1776/.  [facility]  1 (271) LIFENET (619-6321)/.  [facility]  [phone] [place] [address] [phone]   Hours:  Monday through Friday from 9 AM to 3 PM/.  [facility] [facility] Crisis Line  4 [phone], Option 1

## 2021-08-18 NOTE — BH DISCHARGE NOTE NURSING/SOCIAL WORK/PSYCH REHAB - PATIENT PORTAL LINK FT
You can access the FollowMyHealth Patient Portal offered by [facility] by registering at the following website: [email or web address]. By joining [facility]’s FollowMyHealth portal, you will also be able to view your health information using other applications (apps) compatible with our system.

## 2021-08-18 NOTE — BH DISCHARGE NOTE NURSING/SOCIAL WORK/PSYCH REHAB - DISCHARGE INSTRUCTIONS AFTERCARE APPOINTMENTS
In order to check the location, date, or time of your aftercare appointment, please refer to your Discharge Instructions Document given to you upon leaving the hospital.  If you have lost the instructions please call [phone]

## 2021-08-18 NOTE — BH DISCHARGE NOTE NURSING/SOCIAL WORK/PSYCH REHAB - NSDCPRGOAL_PSY_ALL_CORE
Pt has demonstrated progress towards psychiatric rehabilitation goals during the current hospitalization. Pt is able to identify mindful breathing, mindfulness, positive self-talk, having tavon, patience with self and others, prayer, exercise, and gratitude as healthy coping strategies to better manage symptoms. Pt presents as calmer although is tangential at times. Pt denies any current AH/VH or paranoia. Pt continues demonstrate grandiosity with poor insight and judgement into symptoms and treatment. Pt has been refusing medications despite much education and encouragement from staff. Pt denies any current SI/HI, intent, or plan at this time. Pt has been somewhat receptive to skill development. Pt attended approximately 70% of daily psychiatric rehabilitation groups during the current hospitalization. Pt was verbal in groups and participated appropriately. Pt did not require any redirection and was able to verbalize thoughts, needs, and feelings effectively. Pt was visible on the unit and demonstrated some positive socialization with select peers. Pt was not a behavioral management issue on the unit. Pt was provided with a [employer] Ganey survey to complete prior to discharge.

## 2021-08-18 NOTE — BH INPATIENT PSYCHIATRY PROGRESS NOTE - MSE UNSTRUCTURED FT
MSE:  DA/BEH: Adult male in no acute distress; dressed in street clothes; cooperative; fair eye contact.   SPEECH: Approaching normal rate; increased productivity and spontaneity   MOTOR: No PMR/PMA; no other abnormal movements.   MOOD: "good"   AFFECT: neutral to euthymic; stable, reactive, mood incongruent   THOUGHT PROCESS: Grossly linear with moments of circumstantiality, tangentiality, and loosening of associations  THOUGHT CONTENT: Denies SI/HI; grandiose themes; no overtly delusional content elicited  PERCEPTIONS: Denies AVH. Does not appear internally preoccupied. Denies TH.   COGNITION: Grossly intact.   INSIGHT: Poor.   JUDGMENT: Poor.  IMPULSE CONTROL: fair

## 2021-08-18 NOTE — BH INPATIENT PSYCHIATRY PROGRESS NOTE - NSBHFUPINTERVALHXFT_PSY_A_CORE
Chart reviewed. Case discussed with interdisciplinary team. Patient seen and examined for follow up of rhonda. No acute overnight events. Refused medications. Requested PRN benadryl 50mg PO at 10pm for insomnia. Per sleep log, good sleep.     Patient seen in conference room in the morning. Patient says he is good and sleeping well. Discusses plan after discharge. States he will go to [facility] in [place], try to get in touch with new friend/romantic interest. Wants to pursue career as a . Encouraged to think of longer-term plan. Makes various grandiose statements: "I'm very intelligent," "I have this big future people are telling me about," "World is anticipating something for me." Denies SI, HI, AVH.  No physical complaints.

## 2021-08-18 NOTE — BH INPATIENT PSYCHIATRY PROGRESS NOTE - NSBHASSESSSUMMFT_PSY_ALL_CORE
ASSESSMENT:    The patient is a 21 year old male, recently living outside [place] with his family but in [place] for the last week, with a PPH of BD, multiple hospitalizations (most recently several weeks ago in [place]), multiple medication trials (most recently Seroquel) but currently not in treatment, no history of SA who was BIB EMS after vomiting in public after ingesting 3 bags of CBD gummies.     Working diagnosis  Lilli with psychotic features in the setting of untreated BP1    Currently, patient presents with neutral to euthymic affect; calmer and more cooperative; thought process grossly linear with moments of circumstantiality, tangentiality, and loosening of association. Themes of grandiosity but no overt delusional content elicited. Denies SI. Refusing medications. Discharge focused. Insight very poor.     Continued inpatient admission required for safety, stabilization, medication optimization, safe discharge planning.    PLAN:    1. Legal: Admitted on 9.27 status  2. Safety: No reported SI/SIB/HI/VI currently on unit; continue routine observation.   - PRNs haldol 5mg for agitation/ ativan 2mg for anxiety/ benadryl 50mg for insomnia   3. Psychiatric:   - C/w lithium 300mg qhs for lilli/mood stabilization; R/B/A and side effects discussed  - Consider augmentation with atypical antipsychotic  4. Therapy: individual therapy, group & milieu therapy  5. Medical: No acute medical needs identified  6. Collateral: Collateral from family   7. Disposition: When stable

## 2021-08-18 NOTE — BH DISCHARGE NOTE NURSING/SOCIAL WORK/PSYCH REHAB - NSDCPRRECOMMEND_PSY_ALL_CORE
Psychiatric Rehabilitation staff recommends that patient continues outpatient treatment for ongoing medication management, support, and psychotherapy. In addition to, continuing exploring and utilizing healthy coping strategies for improved symptom management and sustained recovery.

## 2021-08-19 PROCEDURE — 99231 SBSQ HOSP IP/OBS SF/LOW 25: CPT
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
## 2021-08-19 NOTE — BH TREATMENT PLAN - NSTXPLANTHERAPYSESSIONSFT_PSY_ALL_CORE
08-12-21  Type of therapy: Cognitive behavior therapy,Dialectical behavior therapy,Coping skills,Creative arts therapy,Health and fitness,Inspiration and motiviation,Leisure development,Mindfulness,Music therapy,Peer advocate,Pet therapy,Self esteem,Spirituality,Stress management,Symptom management  Type of session: Individual  Level of patient participation: Attentive,Engaged,Participates  Duration of participation: 30 minutes  Therapy conducted by: Psych rehab  Therapy Summary: Pt has demonstrated some progress towards psychiatric rehabilitation goals over the past week. Pt identifies playing music, listening to music, writing, and meditating as healthy coping strategies to better manage symptoms. Pt endorses improvements in mood, sleep, and thought processing. Pt continues demonstrate poor insight and judgement into symptoms and treatment at this time. Pt has been refusing medications despite education and encouragement from staff. Pt continues to present with elevated mood, grandiosity, and delusional thinking, however, pt reports that he is on a spiritual journey and does not want to rely on medications. Pt has been increasingly receptive to 1:1 sessions and group activities. Over the past week, pt has attended approximately 85% of daily psychiatric rehabilitation groups. Pt is verbal in groups and participates appropriately. Pt has not required any redirection and is not a behavioral management issue on the unit at this time. Pt reports some frustration regarding being in the hospital, however, is maintaining control. Pt has been visible on the unit and demonstrates an increase in positive socialization with select peers. Writer encouraged pt to continue engaging in treatment in order to better develop coping skills and for continued support.  
  08-12-21  Type of therapy: Cognitive behavior therapy,Dialectical behavior therapy,Coping skills,Creative arts therapy,Health and fitness,Inspiration and motiviation,Leisure development,Mindfulness,Music therapy,Peer advocate,Pet therapy,Self esteem,Spirituality,Stress management,Symptom management  Type of session: Individual  Level of patient participation: Attentive,Engaged,Participates  Duration of participation: 30 minutes  Therapy conducted by: Psych rehab  Therapy Summary: Pt has demonstrated some progress towards psychiatric rehabilitation goals over the past week. Pt identifies playing music, listening to music, writing, and meditating as healthy coping strategies to better manage symptoms. Pt endorses improvements in mood, sleep, and thought processing. Pt continues demonstrate poor insight and judgement into symptoms and treatment at this time. Pt has been refusing medications despite education and encouragement from staff. Pt continues to present with elevated mood, grandiosity, and delusional thinking, however, pt reports that he is on a spiritual journey and does not want to rely on medications. Pt has been increasingly receptive to 1:1 sessions and group activities. Over the past week, pt has attended approximately 85% of daily psychiatric rehabilitation groups. Pt is verbal in groups and participates appropriately. Pt has not required any redirection and is not a behavioral management issue on the unit at this time. Pt reports some frustration regarding being in the hospital, however, is maintaining control. Pt has been visible on the unit and demonstrates an increase in positive socialization with select peers. Writer encouraged pt to continue engaging in treatment in order to better develop coping skills and for continued support.

## 2021-08-19 NOTE — BH TREATMENT PLAN - NSTXMANICINTERRN_PSY_ALL_CORE
Encourage patient to be compliant with the medications
Encourage patient to be compliant with the medications

## 2021-08-19 NOTE — BH SAFETY PLAN - STEP 6 SAFE ENVIRONMENT
-Breathe, clean, pray, express my gratitude, reflect, exercise, believe, trust myself  -Use my coping skills

## 2021-08-19 NOTE — BH TREATMENT PLAN - NSTXDCOPNOINTERSW_PSY_ALL_CORE
SW met with pt to discuss DC options. Pt refuses all tx while hospitalized and after DC. SW communicated with pts father for support and psychoeducation. SW met with team to discuss pt DC, pt not at risk of harming self or others and can be discharged.
SW met with pt for support, met with team regarding DC status and medication.

## 2021-08-19 NOTE — BH TREATMENT PLAN - NSTXMANICGOAL_PSY_ALL_CORE
Be able to identify the early signs of rhonda (e.g. sleep and mood changes) and to employ coping strategies to minimize acting out
Be able to identify the early signs of rhonda (e.g. sleep and mood changes) and to employ coping strategies to minimize acting out

## 2021-08-19 NOTE — BH TREATMENT PLAN - NSTXCAREGIVERPARTICIPATE_PSY_P_CORE
Family/Caregiver participated in identification of needs/problems/goals for treatment
No, patient unwilling to involve family/caregiver

## 2021-08-19 NOTE — BH TREATMENT PLAN - NSTXMANICINTERPR_PSY_ALL_CORE
Pt has demonstrated progress towards psychiatric rehabilitation goals during the current hospitalization. Pt is able to identify mindful breathing, mindfulness, positive self-talk, having tavon, patience with self and others, prayer, exercise, and gratitude as healthy coping strategies to better manage symptoms. Psychiatric Rehabilitation staff recommends that pt continues to demonstrate daily medication compliance and continues to explore additional coping skills after discharge for sustained recovery.
Pt has demonstrated some progress towards psychiatric rehabilitation goals over the past week. Psychiatric Rehabilitation staff will continue to meet with pt individually to provide support, encouragement, and counseling so that they will continue identifying and utilizing effective coping skills for better symptom management.

## 2021-08-19 NOTE — BH INPATIENT PSYCHIATRY DISCHARGE NOTE - HOSPITAL COURSE
Dates of hospitalization: 08/05-8/20/2021    The patient is a 21 year old male, recently living outside [place] with his family but in [place] for the last week, with a PPH of BD, multiple hospitalizations (most recently several weeks ago in [place]), multiple medication trials (most recently Seroquel) but currently not in treatment, no history of SA who was BIB EMS after vomiting in public after ingesting 3 bags of CBD gummies.     On admission to , patient presented with pressured speech, flight of ideas, illogicality, and grandiosity. Patient was distractible, impulsive (leaving psychiatric rouse, traveling around [place] and then to [facility] unaccompanied), grandiose ("I save people by having their children, etc," with increased activity (e.g. travel) and talkativeness. Patient met criteria for rhonda with psychotic features (delusions, illogicality) in the setting of medication non-adherence.     Patient was encouraged to begin treatment with lithium but refused throughout course of hospital stay. Patient repeatedly stated medication was not "part of his journey."     Throughout his hospital course, the patient's manic symptoms gradually subsided (mood became less expansive, activity level decreased, impulsivity decreased). Grandiosity remained throughout course of hospital stay and gradually became less overtly delusional. Psychotic symptoms remained throughout admission, including persistent but diminished grandiose delusions, as well as bizarre delusions related to psychic jacobson/being able to see the future. Thought process, overall, became more linear over time.     At time of discharge, patient reports  **_______.       There were no behavioral problems on the unit. Patient did not become agitated and did not require emergent intramuscular medications or seclusion/restraints. Patient did not self-harm on the unit. Patient remained actively engaged in treatment. Patient participated in individual, group, and milieu therapy. Patient got along appropriately with staff and peers.     Family was contacted at the time of admission to obtain collateral, provide psychoeducation, and collaboratively treatment plan. A family meeting was held prior to discharge for safety planning and disposition planning.     Patient did not have any medical problems during this hospitalization. There were no medical consults.    On day of discharge, the patient has improved significantly and no longer requires inpatient treatment and care. Patient denies depressed mood, passive and active S/I/I/P, H/I/I/P or thoughts of self-harm. Patient is not judged to be an acute danger to self or others at this time.       Risk Assessment:     The patient is at CHRONIC risk of harm to self. Risk factors include **_____________. Protective factors include **_____________________.     On admission the patient was felt to be at an acutely elevated risk of harm to self given their acute presenting sxs of **___________. Over the hospital course, these acute risk factors were addressed by starting medication targeting depression and engagement in group and individual therapy focused on developing skills. The patient was adherent to medication and attended individual and group therapy. On the day of discharge, patient appears euthymic, and denies depressed mood, passive and active S/I/I/P, H/I/I/P or thoughts of self-harm. Patient is future-oriented and hopeful. Given the above, the patient is no longer felt to be at an acutely elevated risk of self-harm and therefore no longer requires inpatient hospitalization. Patient is stable for transition to outpatient level of care.      Patient will be discharged with the following DSM5 diagnoses:   1.      Patient will be discharged on the following medications:   1.  **____________ (30 day supply)   Dates of hospitalization: 08/05-8/20/2021    The patient is a 21 year old male, recently living outside [place] with his family but in [place] for the last week, with a PPH of BD, multiple hospitalizations (most recently several weeks ago in [place]), multiple medication trials (most recently Seroquel) but currently not in treatment, no history of SA who was BIB EMS after vomiting in public after ingesting 3 bags of CBD gummies.     On admission to , patient presented with pressured speech, flight of ideas, illogicality, and grandiosity. Patient was distractible, impulsive (leaving psychiatric rouse, traveling around [place] and then to [facility] unaccompanied), grandiose ("I save people by having their children, etc," with increased activity (e.g. travel) and talkativeness. Patient met criteria for rhonda with psychotic features (delusions, illogicality) in the setting of medication non-adherence.     Patient was encouraged to begin treatment with lithium but refused throughout course of hospital stay. Patient repeatedly stated medication was not "part of his journey."     Throughout his hospital course, the patient's manic symptoms gradually subsided (mood became less expansive, activity level decreased, impulsivity decreased). Grandiosity remained throughout course of hospital stay and gradually became less overtly delusional. Psychotic symptoms remained throughout admission, including persistent but diminished grandiose delusions, as well as bizarre delusions related to psychic jacobson/being able to see the future. Thought process, overall, became more linear over time.     On day of discharge, patient presented with euthymic affect; was calmer and more cooperative; and his thought process was grossly linear with moments of tangentiality and circumstantiality. Patient with continued themes of grandiosity but no overt delusional content elicited. Denied SI. Continued to refuse medications.     There were no behavioral problems on the unit. Patient did not become agitated and did not require emergent intramuscular medications or seclusion/restraints. Patient did not self-harm on the unit. Patient remained actively engaged in treatment. Patient participated in individual, group, and milieu therapy. Patient got along appropriately with staff and peers.     Family was contacted at the time of admission to obtain collateral, provide psychoeducation, and collaboratively treatment plan.     Patient did not have any medical problems during this hospitalization. There were no medical consults.    On day of discharge, the patient has improved significantly and no longer requires inpatient treatment and care. Patient denies depressed mood, passive and active S/I/I/P, H/I/I/P or thoughts of self-harm. Patient is not judged to be an acute danger to self or others at this time.     RISK ASSESSMENT    The patient is at CHRONIC risk of harm to self, given this recent manic episode, and risk factors include untreated BD1, history of multiple hospitalizations, limited insight, housing instability. Protective factors include supportive family, financial stability.     On admission the patient was felt to be at an acutely elevated risk of harm to self given his acute presenting sxs of rhonda. These acute risk factors were attempted to be addressed with medications (but patient refused) and by engagement in group and individual therapy focused on developing skills. On the day of discharge, patient appears euthymic, and denies depressed mood, passive and active S/I/I/P, H/I/I/P, and thoughts of self-harm. Patient not overly psychotic on exam. Patient is future-oriented and hopeful. Given the above, the patient is no longer felt to be at an acutely elevated risk of self-harm and therefore no longer requires inpatient hospitalization. Patient is stable for transition to outpatient level of care.      Patient will be discharged with the following DSM5 diagnoses:   1.  Bipolar Disorder, Type 1

## 2021-08-19 NOTE — BH INPATIENT PSYCHIATRY PROGRESS NOTE - NSBHASSESSSUMMFT_PSY_ALL_CORE
ASSESSMENT:    The patient is a 21 year old male, recently living outside [place] with his family but in [place] for the last week, with a PPH of BD, multiple hospitalizations (most recently several weeks ago in [place]), multiple medication trials (most recently Seroquel) but currently not in treatment, no history of SA who was BIB EMS after vomiting in public after ingesting 3 bags of CBD gummies.     Working diagnosis  Lilli with psychotic features in the setting of untreated BP1    Currently, patient presents with euthymic affect; calmer and more cooperative; thought process grossly linear initially with progressively increasing moments of circumstantiality, tangentiality, and loosening of associations. Continued themes of grandiosity but no overt delusional content elicited. Denies SI. Refusing medications. Discharge focused. Insight very poor.     Continued inpatient admission required for safety, stabilization, medication optimization, safe discharge planning.    PLAN:    1. Legal: Admitted on 9.27 status  2. Safety: No reported SI/SIB/HI/VI currently on unit; continue routine observation.   - PRNs haldol 5mg for agitation/ ativan 2mg for anxiety/ benadryl 50mg for insomnia   3. Psychiatric:   - C/w lithium 300mg qhs for lilli/mood stabilization; R/B/A and side effects discussed  - Consider augmentation with atypical antipsychotic  4. Therapy: individual therapy, group & milieu therapy  5. Medical: No acute medical needs identified  6. Collateral: Collateral from family   7. Disposition: When stable    ASSESSMENT:    The patient is a 21 year old male, recently living outside [place] with his family but in [place] for the last week, with a PPH of BD, multiple hospitalizations (most recently several weeks ago in [place]), multiple medication trials (most recently Seroquel) but currently not in treatment, no history of SA who was BIB EMS after vomiting in public after ingesting 3 bags of CBD gummies.     Working diagnosis  Lilli with psychotic features in the setting of untreated BP1    Currently, patient presents with euthymic affect; calmer and more cooperative; thought process grossly linear initially with progressively increasing moments of circumstantiality, tangentiality, and loosening of associations. Continued themes of grandiosity but no overt delusional content elicited. Denies SI. Refusing medications. Discharge focused. Insight poor.     Continued inpatient admission required for safety, stabilization, medication optimization, safe discharge planning.    PLAN:    1. Legal: Admitted on 9.27 status  2. Safety: No reported SI/SIB/HI/VI currently on unit; continue routine observation.   - PRNs haldol 5mg for agitation/ ativan 2mg for anxiety/ benadryl 50mg for insomnia   3. Psychiatric:   - Patient continues to be offered Li though he refuses daily  4. Therapy: individual therapy, group & milieu therapy  5. Medical: No acute medical needs identified  6. Collateral: Collateral from family   7. Disposition: When stable

## 2021-08-19 NOTE — BH INPATIENT PSYCHIATRY DISCHARGE NOTE - NSBHMETABOLIC_PSY_ALL_CORE_FT
BMI: BMI (kg/m2): 19.9 (08-05-21 @ 12:50)  HbA1c:   Glucose:   BP: 120/84 (08-19-21 @ 07:34) (111/75 - 120/84)  Lipid Panel:
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
## 2021-08-19 NOTE — BH TREATMENT PLAN - NSTXDCOPNOINTERRN_PSY_ALL_CORE
Encourage patient to be compliant with the treatment
Encourage patient to be compliant with the treatment

## 2021-08-19 NOTE — BH INPATIENT PSYCHIATRY PROGRESS NOTE - MSE UNSTRUCTURED FT
MSE:  DA/BEH: Adult male in no acute distress; dressed in street clothes; cooperative; fair eye contact.   SPEECH: Approaching normal rate; increased productivity and spontaneity   MOTOR: No PMR/PMA; no other abnormal movements.   MOOD: "good"   AFFECT: euthymic; stable, reactive, mood incongruent   THOUGHT PROCESS: Grossly linear with progressively increasing moments of circumstantiality, tangentiality, and loosening of associations  THOUGHT CONTENT: Denies SI/HI; grandiose themes; no overtly delusional content elicited  PERCEPTIONS: Denies AVH. Does not appear internally preoccupied. Denies TH.   COGNITION: Grossly intact.   INSIGHT: Poor.   JUDGMENT: Poor.  IMPULSE CONTROL: fair

## 2021-08-20 VITALS — TEMPERATURE: 98 F | DIASTOLIC BLOOD PRESSURE: 72 MMHG | HEART RATE: 95 BPM | SYSTOLIC BLOOD PRESSURE: 127 MMHG

## 2021-08-20 PROCEDURE — 99239 HOSP IP/OBS DSCHRG MGMT >30: CPT

## 2021-08-20 NOTE — BH INPATIENT PSYCHIATRY PROGRESS NOTE - NSTXMANICDATETRGT_PSY_ALL_CORE
19-Aug-2021
13-Aug-2021
13-Aug-2021
20-Aug-2021
13-Aug-2021
19-Aug-2021
13-Aug-2021
19-Aug-2021
20-Aug-2021
13-Aug-2021
13-Aug-2021

## 2021-08-20 NOTE — BH INPATIENT PSYCHIATRY PROGRESS NOTE - MSE UNSTRUCTURED FT
MSE:  DA/BEH: Adult male in no acute distress; dressed in street clothes; cooperative; fair eye contact.   SPEECH: Approaching normal rate; increased productivity and spontaneity   MOTOR: No PMR/PMA; no other abnormal movements.   MOOD: "good"   AFFECT: euthymic; stable, reactive, mood congruent   THOUGHT PROCESS: Grossly linear with with moments of tangeniality and circumstantiality   THOUGHT CONTENT: Denies SI/HI; grandiose themes; no overtly delusional content elicited  PERCEPTIONS: Denies AVH. Does not appear internally preoccupied. Denies TH.   COGNITION: Grossly intact.   INSIGHT: fair.   JUDGMENT: fair.  IMPULSE CONTROL: fair   MSE:  DA/BEH: Adult male in no acute distress; dressed in street clothes; cooperative; fair eye contact.   SPEECH: Approaching normal rate; increased productivity and spontaneity   MOTOR: No PMR/PMA; no other abnormal movements.   MOOD: "good"   AFFECT: euthymic; stable, reactive, mood congruent   THOUGHT PROCESS: Grossly linear with moments of tangentiality and circumstantiality   THOUGHT CONTENT: Denies SI/HI; grandiose themes; no overtly delusional content elicited  PERCEPTIONS: Denies AVH. Does not appear internally preoccupied. Denies TH.   COGNITION: Grossly intact.   INSIGHT: fair.   JUDGMENT: fair.  IMPULSE CONTROL: fair

## 2021-08-20 NOTE — BH INPATIENT PSYCHIATRY PROGRESS NOTE - NSBHINPTBILLING_PSY_ALL_CORE
[id number] - Inpatient Low Complexity
[id number] - Inpatient Low Complexity
[id number] - Inpatient Low Complexity
[id number] - Inpatient Low Complexity
[id number] - Inpatient Low Complexity
[id number] - Inpatient Low Complexity
[id number] - Inpatient Low Complexity
[id number] - Inpatient Low Complexity
[id number] - Hospital Discharge Day Management; more than 30 min
[id number] - Inpatient Low Complexity
[id number] - Inpatient Low Complexity
[id number] - Inpatient Low Complexity
[id number] - Inpatient Low Complexity

## 2021-08-20 NOTE — BH INPATIENT PSYCHIATRY PROGRESS NOTE - NSDCCRITERIA_PSY_ALL_CORE
symptom reduction, stabilization 

## 2021-08-20 NOTE — BH INPATIENT PSYCHIATRY PROGRESS NOTE - NSBHMETABOLIC_PSY_ALL_CORE_FT
BMI: BMI (kg/m2): 19.9 (08-05-21 @ 12:50)  HbA1c:   Glucose:   BP: 109/89 (08-15-21 @ 08:28) (109/89 - 126/77)  Lipid Panel: 
BMI: BMI (kg/m2): 19.9 (08-05-21 @ 12:50)  HbA1c:   Glucose:   BP: 127/83 (08-07-21 @ 06:40) (127/83 - 127/83)  Lipid Panel: 
BMI: BMI (kg/m2): 19.9 (08-05-21 @ 12:50)  HbA1c:   Glucose:   BP: 119/71 (08-13-21 @ 09:07) (119/71 - 135/80)  Lipid Panel: 
BMI: BMI (kg/m2): 19.9 (08-05-21 @ 12:50)  HbA1c:   Glucose:   BP: 127/72 (08-20-21 @ 09:01) (111/75 - 127/72)  Lipid Panel: 
BMI: BMI (kg/m2): 19.9 (08-05-21 @ 12:50)  HbA1c:   Glucose:   BP: 145/85 (08-10-21 @ 08:35) (134/75 - 145/85)  Lipid Panel: 
BMI: BMI (kg/m2): 19.9 (08-05-21 @ 12:50)  HbA1c:   Glucose:   BP: 96/47 (08-05-21 @ 05:49) (96/47 - 124/75)  Lipid Panel: 
BMI: BMI (kg/m2): 19.9 (08-05-21 @ 12:50)  HbA1c:   Glucose:   BP: 120/84 (08-19-21 @ 07:34) (111/75 - 120/84)  Lipid Panel: 
BMI: BMI (kg/m2): 19.9 (08-05-21 @ 12:50)  HbA1c:   Glucose:   BP: 135/80 (08-12-21 @ 08:19) (135/80 - 145/85)  Lipid Panel: 
BMI: BMI (kg/m2): 19.9 (08-05-21 @ 12:50)  HbA1c:   Glucose:   BP: 134/75 (08-09-21 @ 08:56) (127/83 - 134/75)  Lipid Panel: 
BMI: BMI (kg/m2): 19.9 (08-05-21 @ 12:50)  HbA1c:   Glucose:   BP: 127/83 (08-07-21 @ 06:40) (96/47 - 127/83)  Lipid Panel: 
BMI: BMI (kg/m2): 19.9 (08-05-21 @ 12:50)  HbA1c:   Glucose:   BP: 118/70 (08-17-21 @ 08:06) (109/89 - 118/70)  Lipid Panel: 
BMI: BMI (kg/m2): 19.9 (08-05-21 @ 12:50)  HbA1c:   Glucose:   BP: 111/75 (08-18-21 @ 08:23) (111/75 - 118/70)  Lipid Panel: 
BMI: BMI (kg/m2): 19.9 (08-05-21 @ 12:50)  HbA1c:   Glucose:   BP: 145/85 (08-10-21 @ 08:35) (134/75 - 145/85)  Lipid Panel:

## 2021-08-20 NOTE — BH INPATIENT PSYCHIATRY PROGRESS NOTE - NSBHASSESSSUMMFT_PSY_ALL_CORE
ASSESSMENT:    The patient is a 21 year old male, recently living outside [place] with his family but in [place] for the last week, with a PPH of BD, multiple hospitalizations (most recently several weeks ago in [place]), multiple medication trials (most recently Seroquel) but currently not in treatment, no history of SA who was BIB EMS after vomiting in public after ingesting 3 bags of CBD gummies.     Working diagnosis  Lilli with psychotic features in the setting of untreated BP1    Currently, patient presents with euthymic affect; calmer and more cooperative; thought process grossly linear with moments of tangentiality and circumstantiality. Continued themes of grandiosity but no overt delusional content elicited. Denies SI. Refusing medications.     Patient denies SI/I/P, HI/I/P or thoughts of self-harm. Patient endorses adequate sleep and appetite. Patient is future oriented, reporting plans to return to city and eventually look for work and looks forward to discharge.       RISK ASSESSMENT    The patient is at CHRONIC risk of harm to self, given this recent manic episode, and risk factors include untreated BD1, history of multiple hospitalizations, limited insight, housing instability. Protective factors include supportive family, financial stability.     On admission the patient was felt to be at an acutely elevated risk of harm to self given his acute presenting sxs of lilli. These acute risk factors were addressed by engagement in group and individual therapy focused on developing skills. The patient refused medications but attended individual and group therapy. On the day of discharge, patient appears euthymic, and denies depressed mood, passive and active S/I/I/P, H/I/I/P, and thoughts of self-harm. Patient not overly psychotic on exam. Patient is future-oriented and hopeful. Given the above, the patient is no longer felt to be at an acutely elevated risk of self-harm and therefore no longer requires inpatient hospitalization. Patient is stable for transition to outpatient level of care.    PLAN:  - Patient to be discharged to home today without medications due to refusal.   - Safety plan reviewed and emergency procedures discussed including crisis clinic, ED, and use of 911 for acute safety concerns. Patient and family aware of how to contact 1N.   ASSESSMENT:    The patient is a 21 year old male, recently living outside [place] with his family but in [place] for week prior to admission, with a PPH of BD, multiple hospitalizations (most recently several weeks ago in [place]), multiple medication trials (most recently Seroquel) but currently not in treatment, no history of SA who was BIB EMS after vomiting in public after ingesting 3 bags of CBD gummies.     Working diagnosis  Lilli with psychotic features in the setting of untreated BP1    Currently, patient presents with euthymic affect; calmer and more cooperative; thought process grossly linear with moments of tangentiality and circumstantiality. Continued themes of grandiosity but no overt delusional content elicited. Denies SI. Refusing medications.     Patient denies SI/I/P, HI/I/P or thoughts of self-harm. Patient endorses adequate sleep and appetite. Patient is future oriented, reporting plans to return to city and eventually look for work and looks forward to discharge.       RISK ASSESSMENT    The patient is at CHRONIC risk of harm to self, given this recent manic episode, and risk factors include untreated BD1, history of multiple hospitalizations, limited insight, housing instability, refusal to take medications. Protective factors include supportive family, financial stability, no current SI/HI.     On admission the patient was felt to be at an acutely elevated risk of harm to self given his acute presenting sxs of lilli. These acute risk factors were addressed by engagement in group and individual therapy focused on developing skills. The patient refused medications but attended individual and group therapy. On the day of discharge, patient appears euthymic, and denies depressed mood, passive and active S/I/I/P, H/I/I/P, and thoughts of self-harm. Patient not overly psychotic on exam. Patient is future-oriented and hopeful. Given the above, the patient is no longer felt to be at an acutely elevated risk of self-harm and therefore no longer requires inpatient hospitalization. Patient is stable for transition to outpatient level of care.    PLAN:  - Patient to be discharged today without medications due to refusal.   - Safety plan reviewed and emergency procedures discussed including crisis clinic, ED, and use of 911 for acute safety concerns. Patient and family aware of how to contact .

## 2021-08-20 NOTE — BH INPATIENT PSYCHIATRY PROGRESS NOTE - NSBHATTESTSEENBY_PSY_A_CORE
attending Psychiatrist without NP/Trainee
attending Psychiatrist without NP/Trainee
Attending Psychiatrist supervising NP/Trainee, meeting pt...
attending Psychiatrist without NP/Trainee
Attending Psychiatrist supervising NP/Trainee, meeting pt...

## 2021-08-20 NOTE — BH INPATIENT PSYCHIATRY PROGRESS NOTE - PRN MEDS
MEDICATIONS  (PRN):  diphenhydrAMINE 50 milliGRAM(s) Oral at bedtime PRN Insomnia  haloperidol     Tablet 5 milliGRAM(s) Oral every 6 hours PRN agitation  haloperidol    Injectable 5 milliGRAM(s) IntraMuscular every 6 hours PRN Agitation  LORazepam     Tablet 2 milliGRAM(s) Oral every 6 hours PRN Agitation  LORazepam   Injectable 2 milliGRAM(s) IntraMuscular every 6 hours PRN Agitation  
MEDICATIONS  (PRN):  diphenhydrAMINE 50 milliGRAM(s) Oral at bedtime PRN Insomnia  haloperidol     Tablet 5 milliGRAM(s) Oral every 6 hours PRN agitation  haloperidol    Injectable 5 milliGRAM(s) IntraMuscular every 6 hours PRN Agitation  LORazepam     Tablet 2 milliGRAM(s) Oral every 6 hours PRN Agitation  LORazepam   Injectable 2 milliGRAM(s) IntraMuscular every 6 hours PRN Agitation  
MEDICATIONS  (PRN):  diphenhydrAMINE 50 milliGRAM(s) Oral at bedtime PRN Insomnia  haloperidol     Tablet 5 milliGRAM(s) Oral every 6 hours PRN agitation  haloperidol    Injectable 5 milliGRAM(s) IntraMuscular every 6 hours PRN Agitation  LORazepam     Tablet 2 milliGRAM(s) Oral every 6 hours PRN Agitation  LORazepam   Injectable 2 milliGRAM(s) IntraMuscular every 6 hours PRN Agitation  melatonin. 3 milliGRAM(s) Oral at bedtime PRN Insomnia  
MEDICATIONS  (PRN):  diphenhydrAMINE 50 milliGRAM(s) Oral at bedtime PRN Insomnia  haloperidol     Tablet 5 milliGRAM(s) Oral every 6 hours PRN agitation  haloperidol    Injectable 5 milliGRAM(s) IntraMuscular every 6 hours PRN Agitation  LORazepam     Tablet 2 milliGRAM(s) Oral every 6 hours PRN Agitation  LORazepam   Injectable 2 milliGRAM(s) IntraMuscular every 6 hours PRN Agitation  
MEDICATIONS  (PRN):  diphenhydrAMINE 50 milliGRAM(s) Oral at bedtime PRN Insomnia  haloperidol     Tablet 5 milliGRAM(s) Oral every 6 hours PRN agitation  haloperidol    Injectable 5 milliGRAM(s) IntraMuscular every 6 hours PRN Agitation  LORazepam     Tablet 2 milliGRAM(s) Oral every 6 hours PRN Agitation  LORazepam   Injectable 2 milliGRAM(s) IntraMuscular every 6 hours PRN Agitation  melatonin. 3 milliGRAM(s) Oral at bedtime PRN Insomnia  
MEDICATIONS  (PRN):  diphenhydrAMINE 50 milliGRAM(s) Oral at bedtime PRN Insomnia  haloperidol     Tablet 5 milliGRAM(s) Oral every 6 hours PRN agitation  haloperidol    Injectable 5 milliGRAM(s) IntraMuscular every 6 hours PRN Agitation  LORazepam     Tablet 2 milliGRAM(s) Oral every 6 hours PRN Agitation  LORazepam   Injectable 2 milliGRAM(s) IntraMuscular every 6 hours PRN Agitation  

## 2021-08-20 NOTE — BH INPATIENT PSYCHIATRY PROGRESS NOTE - NSTXPROBDCOPNO_PSY_ALL_CORE
DISCHARGE ISSUE - NON-ADHERENT WITH OUTPATIENT SERVICES

## 2021-08-20 NOTE — BH INPATIENT PSYCHIATRY PROGRESS NOTE - CASE SUMMARY
21 year old man admitted with bipolar rhonda, refusing medication in the hospital.  Patient has been in consistently good behavioral control despite manic symptoms.  He continues to have though process and grandiosity consistent with rhonda but aric delusions have diminished and patient has remained calm.  As such, planning for discharge is in progress.  
21 year old man with bipolar rhonda refusing all meds, offered lithium daily.  He is irritable today, slept poorly last night.  
21 year old man with bipolar rhonda with psychosis off all medications and refusing recommended lithium therapy.  Patient continues to have grandiosity, irritability.  Requires treatment and will continue to encourage such.  
21 year old man with bipolar rhonda with psychosis with ongoing grandiose delusions.  Behavior motivated by rhonda places him in danger.  Currently considering lithium trial. 
21 year old man with bipolar rhonda refusing all medication, psychotic with grandiose delusions. Globally irritable, no insight into illness.  Paranoid about me.  Will begin to pursue TOO.  
21 year old man with bipolar disorder currently manic and admitted for rhonda after he was behaving erratically, vomiting in public.  Patient is somewhat calmer, still with grandiose delusions.  Took lithium yesterday, is ambivalent about it today.
21 year old man with bipolar disorder, currently acutely manic with psychosis.  Patient continues to have pressured at times thought disordered speech.  Continues to have elated and at times irritable affect.  He accepted first dose of lithium after much discussion of risks and benefits.  
Patient seen individually for discharge day management. Greater than 30 minutes was spent with patient, staff and charting as part of discharge day management. I was physically present during the service to the patient and personally examined the patient and I was directly involved in the management plan and recommendations of the care provided to the patient.      21 year old man with bipolar rhonda admitted after vomiting in public and found to be manic.  Patient accepted only 1 dose of lithium over hospital course and otherwise refused to take any ordered medication.  Collateral from family and psychiatrist indicates this is a pattern that has occurred many times with multiple recent hospitalizations with refusal to take medications and discharge soon after.  Patient was in good behavioral control throughout admission with mostly euthymic affect and no delusions that significantly affected his behavior especially and including in potentially dangerous ways. His rhonda is gradually improving on its own.  He will be discharged with no medications and with ongoing care from his psychiatrist but no standing appt.  
21 year old man with bipolar rhonda.  Refusing medication, remains psychotic with bizarre thought content and grandiose delusions.  Impulse control is fair in the milieu.  
21 year old man admitted with bipolar rhonda, refusing medication in the hospital.  Patient has been in consistently good behavioral control despite manic symptoms.  He continues to have thought process and grandiosity consistent with rhonda but aric delusions have diminished and patient has remained calm.  As such, planning for discharge is in progress.

## 2021-08-20 NOTE — BH INPATIENT PSYCHIATRY PROGRESS NOTE - NSTXDCOPNODATEEST_PSY_ALL_CORE
06-Aug-2021
06-Aug-2021
05-Aug-2021
06-Aug-2021
06-Aug-2021
05-Aug-2021
05-Aug-2021
06-Aug-2021
06-Aug-2021
12-Aug-2021
12-Aug-2021

## 2021-08-20 NOTE — BH INPATIENT PSYCHIATRY PROGRESS NOTE - NSTXDCOPNOGOAL_PSY_ALL_CORE
Will agree to participate in appropriate outpatient care

## 2021-08-20 NOTE — BH INPATIENT PSYCHIATRY PROGRESS NOTE - NSBHCHARTREVIEWVS_PSY_A_CORE FT
Vital Signs Last 24 Hrs  T(C): 36.8 (08 Aug 2021 08:07), Max: 36.8 (08 Aug 2021 08:07)  T(F): 98.2 (08 Aug 2021 08:07), Max: 98.2 (08 Aug 2021 08:07)  HR: 70 (08 Aug 2021 08:07) (70 - 70)  BP: --  BP(mean): --  RR: --  SpO2: --
Vital Signs Last 24 Hrs  T(C): 36.2 (11 Aug 2021 08:21), Max: 36.6 (10 Aug 2021 17:36)  T(F): 97.2 (11 Aug 2021 08:21), Max: 97.9 (10 Aug 2021 17:36)  HR: --  BP: --  BP(mean): --  RR: --  SpO2: --
Vital Signs Last 24 Hrs  T(C): 36.8 (10 Aug 2021 08:35), Max: 36.8 (10 Aug 2021 08:35)  T(F): 98.2 (10 Aug 2021 08:35), Max: 98.2 (10 Aug 2021 08:35)  HR: 82 (10 Aug 2021 08:35) (82 - 82)  BP: 145/85 (10 Aug 2021 08:35) (145/85 - 145/85)  BP(mean): --  RR: --  SpO2: --
Vital Signs Last 24 Hrs  T(C): 36.4 (07 Aug 2021 06:40), Max: 36.4 (07 Aug 2021 06:40)  T(F): 97.5 (07 Aug 2021 06:40), Max: 97.5 (07 Aug 2021 06:40)  HR: 76 (07 Aug 2021 06:40) (76 - 76)  BP: 127/83 (07 Aug 2021 06:40) (127/83 - 127/83)  BP(mean): --  RR: --  SpO2: --
Vital Signs Last 24 Hrs  T(C): 36.5 (09 Aug 2021 08:56), Max: 36.5 (08 Aug 2021 17:20)  T(F): 97.7 (09 Aug 2021 08:56), Max: 97.7 (08 Aug 2021 17:20)  HR: 115 (09 Aug 2021 08:56) (115 - 115)  BP: 134/75 (09 Aug 2021 08:56) (134/75 - 134/75)  BP(mean): --  RR: --  SpO2: --
Vital Signs Last 24 Hrs  T(C): 36.1 (12 Aug 2021 08:19), Max: 36.6 (11 Aug 2021 17:45)  T(F): 97 (12 Aug 2021 08:19), Max: 97.9 (11 Aug 2021 17:45)  HR: 82 (12 Aug 2021 08:19) (82 - 82)  BP: 135/80 (12 Aug 2021 08:19) (135/80 - 135/80)  BP(mean): --  RR: --  SpO2: --
Vital Signs Last 24 Hrs  T(C): 36.6 (17 Aug 2021 08:06), Max: 37.1 (16 Aug 2021 17:31)  T(F): 97.8 (17 Aug 2021 08:06), Max: 98.7 (16 Aug 2021 17:31)  HR: 91 (17 Aug 2021 08:06) (91 - 91)  BP: 118/70 (17 Aug 2021 08:06) (118/70 - 118/70)  BP(mean): --  RR: --  SpO2: --
Vital Signs Last 24 Hrs  T(C): 37.2 (06 Aug 2021 08:48), Max: 37.2 (06 Aug 2021 08:48)  T(F): 98.9 (06 Aug 2021 08:48), Max: 98.9 (06 Aug 2021 08:48)  HR: --  BP: --  BP(mean): --  RR: --  SpO2: --
Vital Signs Last 24 Hrs  T(C): 36.9 (19 Aug 2021 07:34), Max: 36.9 (19 Aug 2021 07:34)  T(F): 98.4 (19 Aug 2021 07:34), Max: 98.4 (19 Aug 2021 07:34)  HR: 101 (19 Aug 2021 07:34) (101 - 101)  BP: 120/84 (19 Aug 2021 07:34) (120/84 - 120/84)  BP(mean): --  RR: --  SpO2: --
Vital Signs Last 24 Hrs  T(C): 36.4 (20 Aug 2021 09:01), Max: 37.2 (19 Aug 2021 17:23)  T(F): 97.6 (20 Aug 2021 09:01), Max: 99 (19 Aug 2021 17:23)  HR: 95 (20 Aug 2021 09:01) (95 - 95)  BP: 127/72 (20 Aug 2021 09:01) (127/72 - 127/72)  BP(mean): --  RR: --  SpO2: --
Vital Signs Last 24 Hrs  T(C): 36.1 (16 Aug 2021 09:50), Max: 36.6 (15 Aug 2021 17:45)  T(F): 97 (16 Aug 2021 09:50), Max: 97.8 (15 Aug 2021 17:45)  HR: 79 (16 Aug 2021 09:50) (79 - 79)  BP: --  BP(mean): --  RR: --  SpO2: --
Vital Signs Last 24 Hrs  T(C): 36.6 (13 Aug 2021 09:07), Max: 36.8 (12 Aug 2021 17:24)  T(F): 97.8 (13 Aug 2021 09:07), Max: 98.2 (12 Aug 2021 17:24)  HR: 107 (13 Aug 2021 09:07) (107 - 107)  BP: 119/71 (13 Aug 2021 09:07) (119/71 - 119/71)  BP(mean): --  RR: --  SpO2: --
Vital Signs Last 24 Hrs  T(C): 36.9 (18 Aug 2021 08:23), Max: 36.9 (18 Aug 2021 08:23)  T(F): 98.5 (18 Aug 2021 08:23), Max: 98.5 (18 Aug 2021 08:23)  HR: 95 (18 Aug 2021 08:23) (95 - 95)  BP: 111/75 (18 Aug 2021 08:23) (111/75 - 111/75)  BP(mean): --  RR: --  SpO2: --

## 2021-08-20 NOTE — BH INPATIENT PSYCHIATRY PROGRESS NOTE - CURRENT MEDICATION
MEDICATIONS  (STANDING):  lithium 300 milliGRAM(s) Oral at bedtime    MEDICATIONS  (PRN):  diphenhydrAMINE 50 milliGRAM(s) Oral at bedtime PRN Insomnia  haloperidol     Tablet 5 milliGRAM(s) Oral every 6 hours PRN agitation  haloperidol    Injectable 5 milliGRAM(s) IntraMuscular every 6 hours PRN Agitation  LORazepam     Tablet 2 milliGRAM(s) Oral every 6 hours PRN Agitation  LORazepam   Injectable 2 milliGRAM(s) IntraMuscular every 6 hours PRN Agitation  
MEDICATIONS  (STANDING):  lithium 300 milliGRAM(s) Oral at bedtime    MEDICATIONS  (PRN):  diphenhydrAMINE 50 milliGRAM(s) Oral at bedtime PRN Insomnia  haloperidol     Tablet 5 milliGRAM(s) Oral every 6 hours PRN agitation  haloperidol    Injectable 5 milliGRAM(s) IntraMuscular every 6 hours PRN Agitation  LORazepam     Tablet 2 milliGRAM(s) Oral every 6 hours PRN Agitation  LORazepam   Injectable 2 milliGRAM(s) IntraMuscular every 6 hours PRN Agitation  
MEDICATIONS  (STANDING):  lithium 300 milliGRAM(s) Oral at bedtime  melatonin. 3 milliGRAM(s) Oral once    MEDICATIONS  (PRN):  diphenhydrAMINE 50 milliGRAM(s) Oral at bedtime PRN Insomnia  haloperidol     Tablet 5 milliGRAM(s) Oral every 6 hours PRN agitation  haloperidol    Injectable 5 milliGRAM(s) IntraMuscular every 6 hours PRN Agitation  LORazepam     Tablet 2 milliGRAM(s) Oral every 6 hours PRN Agitation  LORazepam   Injectable 2 milliGRAM(s) IntraMuscular every 6 hours PRN Agitation  melatonin. 3 milliGRAM(s) Oral at bedtime PRN Insomnia  
MEDICATIONS  (STANDING):  lithium CR (ESKALITH-CR) 450 milliGRAM(s) Oral at bedtime    MEDICATIONS  (PRN):  diphenhydrAMINE 50 milliGRAM(s) Oral at bedtime PRN Insomnia  haloperidol     Tablet 5 milliGRAM(s) Oral every 6 hours PRN agitation  haloperidol    Injectable 5 milliGRAM(s) IntraMuscular every 6 hours PRN Agitation  LORazepam     Tablet 2 milliGRAM(s) Oral every 6 hours PRN Agitation  LORazepam   Injectable 2 milliGRAM(s) IntraMuscular every 6 hours PRN Agitation  
MEDICATIONS  (STANDING):  lithium CR (ESKALITH-CR) 450 milliGRAM(s) Oral at bedtime    MEDICATIONS  (PRN):  diphenhydrAMINE 50 milliGRAM(s) Oral at bedtime PRN Insomnia  haloperidol     Tablet 5 milliGRAM(s) Oral every 6 hours PRN agitation  haloperidol    Injectable 5 milliGRAM(s) IntraMuscular every 6 hours PRN Agitation  LORazepam     Tablet 2 milliGRAM(s) Oral every 6 hours PRN Agitation  LORazepam   Injectable 2 milliGRAM(s) IntraMuscular every 6 hours PRN Agitation  
MEDICATIONS  (STANDING):  lithium 300 milliGRAM(s) Oral at bedtime    MEDICATIONS  (PRN):  diphenhydrAMINE 50 milliGRAM(s) Oral at bedtime PRN Insomnia  haloperidol     Tablet 5 milliGRAM(s) Oral every 6 hours PRN agitation  haloperidol    Injectable 5 milliGRAM(s) IntraMuscular every 6 hours PRN Agitation  LORazepam     Tablet 2 milliGRAM(s) Oral every 6 hours PRN Agitation  LORazepam   Injectable 2 milliGRAM(s) IntraMuscular every 6 hours PRN Agitation  
MEDICATIONS  (STANDING):  lithium CR (ESKALITH-CR) 450 milliGRAM(s) Oral at bedtime    MEDICATIONS  (PRN):  diphenhydrAMINE 50 milliGRAM(s) Oral at bedtime PRN Insomnia  haloperidol     Tablet 5 milliGRAM(s) Oral every 6 hours PRN agitation  haloperidol    Injectable 5 milliGRAM(s) IntraMuscular every 6 hours PRN Agitation  LORazepam     Tablet 2 milliGRAM(s) Oral every 6 hours PRN Agitation  LORazepam   Injectable 2 milliGRAM(s) IntraMuscular every 6 hours PRN Agitation  
MEDICATIONS  (STANDING):  lithium 300 milliGRAM(s) Oral at bedtime    MEDICATIONS  (PRN):  diphenhydrAMINE 50 milliGRAM(s) Oral at bedtime PRN Insomnia  haloperidol     Tablet 5 milliGRAM(s) Oral every 6 hours PRN agitation  haloperidol    Injectable 5 milliGRAM(s) IntraMuscular every 6 hours PRN Agitation  LORazepam     Tablet 2 milliGRAM(s) Oral every 6 hours PRN Agitation  LORazepam   Injectable 2 milliGRAM(s) IntraMuscular every 6 hours PRN Agitation  
MEDICATIONS  (STANDING):  lithium 600 milliGRAM(s) Oral at bedtime    MEDICATIONS  (PRN):  diphenhydrAMINE 50 milliGRAM(s) Oral at bedtime PRN Insomnia  haloperidol     Tablet 5 milliGRAM(s) Oral every 6 hours PRN agitation  haloperidol    Injectable 5 milliGRAM(s) IntraMuscular every 6 hours PRN Agitation  LORazepam     Tablet 2 milliGRAM(s) Oral every 6 hours PRN Agitation  LORazepam   Injectable 2 milliGRAM(s) IntraMuscular every 6 hours PRN Agitation  
MEDICATIONS  (STANDING):  lithium 300 milliGRAM(s) Oral at bedtime    MEDICATIONS  (PRN):  diphenhydrAMINE 50 milliGRAM(s) Oral at bedtime PRN Insomnia  haloperidol     Tablet 5 milliGRAM(s) Oral every 6 hours PRN agitation  haloperidol    Injectable 5 milliGRAM(s) IntraMuscular every 6 hours PRN Agitation  LORazepam     Tablet 2 milliGRAM(s) Oral every 6 hours PRN Agitation  LORazepam   Injectable 2 milliGRAM(s) IntraMuscular every 6 hours PRN Agitation  
MEDICATIONS  (STANDING):  lithium 300 milliGRAM(s) Oral at bedtime  melatonin. 3 milliGRAM(s) Oral once    MEDICATIONS  (PRN):  diphenhydrAMINE 50 milliGRAM(s) Oral at bedtime PRN Insomnia  haloperidol     Tablet 5 milliGRAM(s) Oral every 6 hours PRN agitation  haloperidol    Injectable 5 milliGRAM(s) IntraMuscular every 6 hours PRN Agitation  LORazepam     Tablet 2 milliGRAM(s) Oral every 6 hours PRN Agitation  LORazepam   Injectable 2 milliGRAM(s) IntraMuscular every 6 hours PRN Agitation  melatonin. 3 milliGRAM(s) Oral at bedtime PRN Insomnia

## 2021-08-20 NOTE — BH INPATIENT PSYCHIATRY PROGRESS NOTE - NSTXMANICPROGRES_PSY_ALL_CORE
Improving
Improving
Met - goal discontinued
Improving
No Change
Improving
No Change
Improving
Met - goal discontinued
No Change

## 2021-08-20 NOTE — BH INPATIENT PSYCHIATRY PROGRESS NOTE - NSTXMANICDATEEST_PSY_ALL_CORE
05-Aug-2021
05-Aug-2021
06-Aug-2021
06-Aug-2021
05-Aug-2021
05-Aug-2021
06-Aug-2021
05-Aug-2021
05-Aug-2021
06-Aug-2021
06-Aug-2021
05-Aug-2021
06-Aug-2021

## 2021-08-20 NOTE — BH INPATIENT PSYCHIATRY PROGRESS NOTE - NSBHCONSULTIPREASON_PSY_A_CORE
danger to self; mental illness expected to respond to inpatient care

## 2021-08-20 NOTE — BH INPATIENT PSYCHIATRY PROGRESS NOTE - NSTXMANICGOAL_PSY_ALL_CORE
Be able to identify the early signs of rhonda (e.g. sleep and mood changes) and to employ coping strategies to minimize acting out

## 2021-08-20 NOTE — BH INPATIENT PSYCHIATRY PROGRESS NOTE - NSBHFUPINTERVALHXFT_PSY_A_CORE
Chart reviewed. Case discussed with interdisciplinary team. Patient seen and examined for follow up of rhonda. No acute overnight events. Refused medications. No PRNs requested or received. Per sleep log, good sleep.     Patient seen in day room in the morning. Patient says he is good and sleeping well. He comments on plan after discharge. Will return to [facility] in [place]. May look for work. Uncertain if he will see family soon. Patient encouraged to consider lithium in the future. Patient encouraged not to smoke MJ. Patient expresses unwillingness to change MJ use. Patient says he has "helped so many people" during his hospital stay. Denies SI, HI, AVH.  No physical complaints.

## 2021-08-20 NOTE — BH INPATIENT PSYCHIATRY PROGRESS NOTE - NSTXPROBMANIC_PSY_ALL_CORE
MANIC SYMPTOMS

## 2021-08-20 NOTE — BH INPATIENT PSYCHIATRY PROGRESS NOTE - NSBHCONSDANGERSELF_PSY_A_CORE
unable to care for self
- - -

## 2021-08-20 NOTE — BH INPATIENT PSYCHIATRY PROGRESS NOTE - NSBHFUPINTERVALCCFT_PSY_A_CORE
Patient seen for f/u for rhonda. 
Patient seen for f/u for rhonda. 
"I still didn't take the Lithium but I decided I can control my Bipolar!"  
Patient seen for f/u for rhonda. 
"I didn't take the Lithium!"  
Patient seen for f/u for rhonda. 
Patient seen for f/u for rhonda.

## 2021-08-20 NOTE — BH INPATIENT PSYCHIATRY PROGRESS NOTE - NSTXDCOPNODATETRGT_PSY_ALL_CORE
12-Aug-2021
12-Aug-2021
13-Aug-2021
12-Aug-2021
13-Aug-2021
13-Aug-2021
12-Aug-2021
12-Aug-2021
13-Aug-2021
19-Aug-2021
19-Aug-2021

## 2022-01-01 NOTE — ED ADULT NURSE NOTE - NSIMPLEMENTINTERV_GEN_ALL_ED
Implemented All Fall Risk Interventions:  [place]omeo to call system. Call bell, personal items and telephone within reach. Instruct patient to call for assistance. Room bathroom lighting operational. Non-slip footwear when patient is off stretcher. Physically safe environment: no spills, clutter or unnecessary equipment. Stretcher in lowest position, wheels locked, appropriate side rails in place. Provide visual cue, wrist band, yellow gown, etc. Monitor gait and stability. Monitor for mental status changes and reorient to person, place, and time. Review medications for side effects contributing to fall risk. Reinforce activity limits and safety measures with patient and family. immune 08/22/2021/immune
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
